# Patient Record
Sex: MALE | Race: WHITE | Employment: OTHER | URBAN - METROPOLITAN AREA
[De-identification: names, ages, dates, MRNs, and addresses within clinical notes are randomized per-mention and may not be internally consistent; named-entity substitution may affect disease eponyms.]

---

## 2018-10-18 NOTE — PROGRESS NOTES
Patient pre-assessment complete for AMBER with Dr Porfirio Reyes scheduled for 10/19/18 at 11am, arrival time 9am. Patient verified using . Patient instructed to bring all home medications in labeled bottles on the day of procedure. NPO status reinforced. Patient instructed to HOLD lasix & spironolactone in am. Instructed they can take all other medications excluding vitamins & supplements. Patient verbalizes understanding of all instructions & denies any questions at this time. Pt states that he stopped his xarelto (last dose Monday) in preparation for procedure, instructed to start his xarelto back as it is not necessary to stop for this procedure. Discussed importance of continuing this medication as ordered & to not stop without talking to Cardiologist first. Voiced understanding, states \"I thought they were doing the whole watchman procedure tomorrow, that's why I stopped it\" Informed only doing AMBER tomorrow & that we would schedule the Watchman procedure for another date. Informed that once his procedure is scheduled we would discuss his xarelto sothat he knew exactly when t o stop it.  Voiced understanding

## 2018-10-18 NOTE — PROGRESS NOTES
Called patient to inform of change in procedure time, procedure moved to 1:30pm with arrival time of 12pm. Voiced Understanding. Patient agreeable with time change.

## 2018-10-19 ENCOUNTER — HOSPITAL ENCOUNTER (OUTPATIENT)
Age: 78
Setting detail: OUTPATIENT SURGERY
Discharge: HOME | End: 2018-10-19
Attending: INTERNAL MEDICINE | Admitting: INTERNAL MEDICINE

## 2018-10-19 ENCOUNTER — HOSPITAL ENCOUNTER (OUTPATIENT)
Dept: CARDIAC CATH/INVASIVE PROCEDURES | Age: 78
Discharge: HOME | End: 2018-10-19

## 2018-10-19 RX ORDER — HYDROMORPHONE HYDROCHLORIDE 2 MG/ML
0.5 INJECTION, SOLUTION INTRAMUSCULAR; INTRAVENOUS; SUBCUTANEOUS
Status: CANCELLED | OUTPATIENT
Start: 2018-10-19

## 2018-10-19 RX ORDER — SODIUM CHLORIDE 0.9 % (FLUSH) 0.9 %
5-10 SYRINGE (ML) INJECTION AS NEEDED
Status: CANCELLED | OUTPATIENT
Start: 2018-10-19

## 2018-10-19 RX ORDER — SODIUM CHLORIDE 0.9 % (FLUSH) 0.9 %
5-10 SYRINGE (ML) INJECTION EVERY 8 HOURS
Status: CANCELLED | OUTPATIENT
Start: 2018-10-19

## 2018-10-19 RX ORDER — LIDOCAINE HYDROCHLORIDE 10 MG/ML
0.1 INJECTION INFILTRATION; PERINEURAL AS NEEDED
Status: CANCELLED | OUTPATIENT
Start: 2018-10-19

## 2018-10-19 RX ORDER — ONDANSETRON 2 MG/ML
4 INJECTION INTRAMUSCULAR; INTRAVENOUS
Status: CANCELLED | OUTPATIENT
Start: 2018-10-19 | End: 2018-10-20

## 2018-10-19 RX ORDER — ALBUTEROL SULFATE 0.83 MG/ML
2.5 SOLUTION RESPIRATORY (INHALATION) AS NEEDED
Status: CANCELLED | OUTPATIENT
Start: 2018-10-19

## 2018-10-19 RX ORDER — SODIUM CHLORIDE, SODIUM LACTATE, POTASSIUM CHLORIDE, CALCIUM CHLORIDE 600; 310; 30; 20 MG/100ML; MG/100ML; MG/100ML; MG/100ML
1000 INJECTION, SOLUTION INTRAVENOUS CONTINUOUS
Status: CANCELLED | OUTPATIENT
Start: 2018-10-19 | End: 2018-10-20

## 2018-10-19 NOTE — PROGRESS NOTES
Stated went down stairs to cafeteria while waiting to be called for procedure and ate several bites of chicken, corn and had a half a cup of tea. Dr. Itz Palacios notified. Procedure cancelled.

## 2018-10-19 NOTE — PROGRESS NOTES
Patient received to [address] [place] [address] room # 8  Ambulatory from [facility]. Patient scheduled for AMBER today with Dr Ashleigh Guerrero. Procedure reviewed & questions answered, voiced good understanding consent obtained & placed on chart. All medications and medical history reviewed. Will prep patient per orders. Patient & family updated on plan of care.       The patient has a fraility score of 3-MANAGING WELL, based on reports some SOB with exertion

## 2018-11-05 ENCOUNTER — HOSPITAL ENCOUNTER (OUTPATIENT)
Dept: CARDIAC CATH/INVASIVE PROCEDURES | Age: 78
Discharge: HOME | End: 2018-11-05
Attending: INTERNAL MEDICINE | Admitting: INTERNAL MEDICINE
Payer: MEDICARE

## 2018-11-05 VITALS
TEMPERATURE: 98.1 F | DIASTOLIC BLOOD PRESSURE: 56 MMHG | HEART RATE: 65 BPM | RESPIRATION RATE: 14 BRPM | OXYGEN SATURATION: 94 % | WEIGHT: 186 LBS | BODY MASS INDEX: 26.04 KG/M2 | HEIGHT: 71 IN | SYSTOLIC BLOOD PRESSURE: 117 MMHG

## 2018-11-05 LAB
ATRIAL RATE: 63 BPM
CALCULATED P AXIS, ECG09: -23 DEGREES
CALCULATED R AXIS, ECG10: 134 DEGREES
CALCULATED T AXIS, ECG11: 141 DEGREES
DIAGNOSIS, 93000: NORMAL
ERYTHROCYTE [DISTWIDTH] IN BLOOD BY AUTOMATED COUNT: 14.2 %
HCT VFR BLD AUTO: 46.6 % (ref 41.1–50.3)
HGB BLD-MCNC: 15.5 G/DL (ref 13.6–17.2)
INR PPP: 1.5
MCH RBC QN AUTO: 33 PG (ref 26.1–32.9)
MCHC RBC AUTO-ENTMCNC: 33.3 G/DL (ref 31.4–35)
MCV RBC AUTO: 99.4 FL (ref 79.6–97.8)
NRBC # BLD: 0 K/UL (ref 0–0.2)
PLATELET # BLD AUTO: 142 K/UL (ref 150–450)
PMV BLD AUTO: 11.5 FL (ref 9.4–12.3)
PROTHROMBIN TIME: 17.2 SEC (ref 11.5–14.5)
Q-T INTERVAL, ECG07: 274 MS
QRS DURATION, ECG06: 34 MS
QTC CALCULATION (BEZET), ECG08: 387 MS
RBC # BLD AUTO: 4.69 M/UL (ref 4.23–5.6)
VENTRICULAR RATE, ECG03: 120 BPM
WBC # BLD AUTO: 9.4 K/UL (ref 4.3–11.1)

## 2018-11-05 PROCEDURE — 74011250636 HC RX REV CODE- 250/636: Performed by: INTERNAL MEDICINE

## 2018-11-05 PROCEDURE — 74011000250 HC RX REV CODE- 250: Performed by: INTERNAL MEDICINE

## 2018-11-05 PROCEDURE — 74011250636 HC RX REV CODE- 250/636

## 2018-11-05 PROCEDURE — 93005 ELECTROCARDIOGRAM TRACING: CPT | Performed by: INTERNAL MEDICINE

## 2018-11-05 PROCEDURE — 85610 PROTHROMBIN TIME: CPT

## 2018-11-05 PROCEDURE — 85027 COMPLETE CBC AUTOMATED: CPT

## 2018-11-05 PROCEDURE — 93312 ECHO TRANSESOPHAGEAL: CPT

## 2018-11-05 PROCEDURE — 99152 MOD SED SAME PHYS/QHP 5/>YRS: CPT

## 2018-11-05 RX ORDER — MIDAZOLAM HYDROCHLORIDE 1 MG/ML
.5-2 INJECTION, SOLUTION INTRAMUSCULAR; INTRAVENOUS
Status: DISCONTINUED | OUTPATIENT
Start: 2018-11-05 | End: 2018-11-05

## 2018-11-05 RX ORDER — LIDOCAINE HYDROCHLORIDE 20 MG/ML
15 SOLUTION OROPHARYNGEAL AS NEEDED
Status: DISCONTINUED | OUTPATIENT
Start: 2018-11-05 | End: 2018-11-05

## 2018-11-05 RX ORDER — FENTANYL CITRATE 50 UG/ML
25-100 INJECTION, SOLUTION INTRAMUSCULAR; INTRAVENOUS
Status: DISCONTINUED | OUTPATIENT
Start: 2018-11-05 | End: 2018-11-05

## 2018-11-05 RX ORDER — SODIUM CHLORIDE 9 MG/ML
75 INJECTION, SOLUTION INTRAVENOUS CONTINUOUS
Status: DISCONTINUED | OUTPATIENT
Start: 2018-11-05 | End: 2018-11-05 | Stop reason: HOSPADM

## 2018-11-05 RX ADMIN — FENTANYL CITRATE 50 MCG: 50 INJECTION, SOLUTION INTRAMUSCULAR; INTRAVENOUS at 14:52

## 2018-11-05 RX ADMIN — MIDAZOLAM HYDROCHLORIDE 2 MG: 1 INJECTION, SOLUTION INTRAMUSCULAR; INTRAVENOUS at 14:59

## 2018-11-05 RX ADMIN — SODIUM CHLORIDE 75 ML/HR: 900 INJECTION, SOLUTION INTRAVENOUS at 14:30

## 2018-11-05 RX ADMIN — LIDOCAINE HYDROCHLORIDE 15 ML: 20 SOLUTION ORAL; TOPICAL at 14:30

## 2018-11-05 RX ADMIN — MIDAZOLAM HYDROCHLORIDE 2 MG: 1 INJECTION, SOLUTION INTRAMUSCULAR; INTRAVENOUS at 14:52

## 2018-11-05 NOTE — PROGRESS NOTES
Pt arrived, ambulated to room with no visible problems, planned AMBER for Dr Daly Anna. Consent signed, Procedure discussed with pt all questions answered voiced understanding. Medications and history discussed with pt.  
 
Pt prepped per ordersThe patient has a fraility score of 4-VULNERABLE, based on ability to complete ADLS without assistance, increased symptoms on Arrival.

## 2018-11-05 NOTE — PROGRESS NOTES
TRANSFER - OUT REPORT: 
 
Verbal report given to María Lazo RN (name) on Danile Bustillos  being transferred to [facility] (unit) for routine progression of care Report consisted of patients Situation, Background, Assessment and  
Recommendations(SBAR). Information from the following report(s) SBAR was reviewed with the receiving nurse. Lines:  
Peripheral IV 11/05/18 Anterior; Left Wrist (Active) Opportunity for questions and clarification was provided. Pt had AMBER. Tolerated well. Pt received Versed 4mg IV and Fentanyl 50mcg IV. Pt to remain NPO until 1630.

## 2018-11-05 NOTE — DISCHARGE INSTRUCTIONS
AFTER YOU TRANSESOPHAGEAL ECHOCARDIOGRAM    Be sure someone else drives you home. You may feel drowsy for several hours. Do not eat or drink for at least two hours after your procedure. Your throat will be numb and there is a risk you might have difficulty swallowing for a while. Be careful when you do eat or drink for the first time especially with hot fluids since you could easily burn your throat. Call your doctor if:    · You are bleeding from your throat or mouth. · You have trouble breathing all of a sudden. · You have chest pain or any pain that spreads to your neck, jaw, or arms. · You have questions or concerns. · You have a fever greater than 101°F.    Doctor: ***    Special Instructions:    No driving for 24 hours.   ***

## 2018-11-05 NOTE — PROGRESS NOTES
Report received from [facility] cath lab RN for continue of care post AMBER. . Will continue to monitor until discharge

## 2018-11-05 NOTE — H&P
[facility] History & Physical  
  
Date of  Admission: 11/5/2018 11:14 AM  
 
CC: Pre Watchman AMBER 
 
HPI:  Emily Cardenas is a 69 y.o. male Emily Cardenas is a very pleasant 69year old patient with a long-term ischemic dilated cardiomyopathy that had been followed by [facility] for many years' and now followed by Clarisa Nelson has a history of coronary artery disease with four-vessel CABG in October 2004. [facility] also has a history of chronic ischemic dilated cardiomyopathy with ejection fraction of 20-25% for many years' time.  He did have a history of a Medtronic dual-chamber ICD that was implanted in the past and eventually appeared to be upgraded to a [address] did have a recalled lead and underwent a laser lead extraction by Dr. Karis Ramos in October 2011.   
  
Pt had episode of some palpitations and probable afib over the past few weeks, however, currently feels well. He denies chest pain, SOB, presyncope, or syncope. Pt was taken off anticoagulation for recurrent hematuria.  
  
Cardiac PMH: (Old records have been reviewed and summarized below) 1.  Coronary artery disease status post four-vessel CABG in October 2004. 2.  History of myocardial infarction as early as 1995. 3.  Biventricular ICD since October 2004. 4.  Laser lead extraction of recalled Medtronic ICD lead in September 2011 by Dr. Karis Ramos. 5.  Atrial lead damage at the time of laser lead extraction.   
6.  Currently has a Medtronic biventricular ICD implanted in September 2011. 7.  Moderate to severe mitral regurgitation. 8.  Paroxysmal atrial fibrillation. 9.  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   
10. Complete thrombosis of left subclavian vein. 11. Chronic systolic heart failure, EF 25%. 12. Ventricular tachycardia. 13. ECHO - Jan 2014 - EF 15%, mod MR 
14. CV - June 2014 - Able to maintain NSR for about 1 week 15. CV - June 2014 - Able to maintain NSR with amio 16. ECHO - Aug 2014 - EF<15% 17. A. Fib ablation - Oct 2014 - done at [facility] 18. Gen Change - MDT - Aug 2015 19. CV - Tikosyn loading - May 2016 
20. Acute Amio reaction - May 2016 
21. ECHO - Aug 2016 - EF 15-20%, Mod MR Past Medical History:  
Diagnosis Date  Acute diastolic CHF (congestive heart failure) ([address] 9/15/2015  Arrhythmia   
 afib, icd  Atypical atrial flutter ([address] 4/14/2016  Biventricular ICD (implantable cardioverter-defibrillator) in place 2004  CAD (coronary artery disease) stents heart 2002, cabg 2002  Chest pain 9/15/2015  Chronic systolic CHF (congestive heart failure) ([address] 4/28/2016  Coronary atherosclerosis of native coronary vessel 9/15/2015  Dyspnea 9/15/2015  Essential hypertension, benign 9/15/2015  Heart failure ([address]  Hx of CABG 2004  Hyperlipidemia 9/15/2015  Hypertension  Hypothyroidism 6/3/2014  Ischemic cardiomyopathy 6/3/2014  Long-term use of high-risk medication 5/23/2016  Myocardial infarction acute ([address] 9/15/2015  Other and unspecified angina pectoris 9/15/2015  Palpitations 9/15/2015  Paroxysmal ventricular tachycardia ([address] 9/15/2015  Persistent atrial fibrillation ([address] 5/6/2016 1. Attempt A. Fib ablation at [facility] - 2015 2. 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  
Procedure Laterality Date  CARDIAC SURG PROCEDURE UNLIST    
 cabg nov 2004  HX HEENT    
 tonsillectomy  HX PACEMAKER    
 nov 2004 Allergies Allergen Reactions  Pradaxa [Dabigatran Etexilate] Not Reported This Time and Anaphylaxis  Shellfish Derived Nausea and Vomiting Social History Socioeconomic History  Marital status:  Spouse name: Not on file  Number of children: Not on file  Years of education: Not on file  Highest education level: Not on file Social Needs  Financial resource strain: Not on file  Food insecurity - worry: Not on file  Food insecurity - inability: Not on file  Transportation needs - medical: Not on file  Transportation needs - non-medical: Not on file Occupational History  Not on file Tobacco Use  Smoking status: Former Smoker Last attempt to quit: 1/1/2003 Years since quitting: 15.8  Smokeless tobacco: Never Used  Tobacco comment: pipe quit 10 years ago Substance and Sexual Activity  Alcohol use: No  
 Drug use: No  
 Sexual activity: Not on file Other Topics Concern  Not on file Social History Narrative  Not on file Family History Problem Relation Age of Onset  Heart Attack Other  Heart Failure Other Congestive Current Facility-Administered Medications Medication Dose Route Frequency  0.9% sodium chloride infusion  75 mL/hr IntraVENous CONTINUOUS  
 midazolam (VERSED) injection 0.5-2 mg  0.5-2 mg IntraVENous Multiple  fentaNYL citrate (PF) injection  mcg   mcg IntraVENous Multiple  lidocaine (XYLOCAINE) 2 % viscous solution 15 mL  15 mL Mouth/Throat PRN Review of Systems Review of Systems Constitution: Negative for chills, decreased appetite, fever, malaise/fatigue, weight gain and weight loss. Cardiovascular: Negative for chest pain, dyspnea on exertion, irregular heartbeat, near-syncope, orthopnea, palpitations, paroxysmal nocturnal dyspnea and syncope. Respiratory: Negative for cough, shortness of breath and wheezing. Musculoskeletal: Negative for joint pain and myalgias. Subjective:  
 
Visit Vitals BP 99/59 Pulse 65 Temp 98.1 °F (36.7 °C) Resp 17 Ht 5' 10.5\" (1.791 m) Wt 84.4 kg (186 lb) SpO2 96% BMI 26.31 kg/m² General appearance: Alert, well appearing, and in no distress Eyes: Sclerae anicteric ENT: Hearing grossly normal bilaterally CV: RR, paced, distant S1, S2, no M/R/G, no JVD, normal distal pulses, no lower extremity edema Pulm: Clear to auscultation, no wheezes, no crackles, no accessory muscle use GI: Soft, nontender, nondistended Neuro: Alert and oriented Skin: Normal coloration and turgor. 
  
 
 
 
Labs:  
Recent Results (from the past 24 hour(s)) EKG, 12 LEAD, INITIAL Collection Time: 11/05/18  1:27 PM  
Result Value Ref Range Ventricular Rate 120 BPM  
 Atrial Rate 63 BPM  
 QRS Duration 34 ms Q-T Interval 274 ms QTC Calculation (Bezet) 387 ms Calculated P Axis -23 degrees Calculated R Axis 134 degrees Calculated T Axis 141 degrees Diagnosis Ventricular-paced rhythm Abnormal ECG When compared with ECG of 09-MAY-2016 07:58, 
Vent. rate has increased BY   5 BPM 
  
CBC W/O DIFF Collection Time: 11/05/18  1:30 PM  
Result Value Ref Range WBC 9.4 4.3 - 11.1 K/uL  
 RBC 4.69 4.23 - 5.6 M/uL  
 HGB 15.5 13.6 - 17.2 g/dL HCT 46.6 41.1 - 50.3 % MCV 99.4 (H) 79.6 - 97.8 FL  
 MCH 33.0 (H) 26.1 - 32.9 PG  
 MCHC 33.3 31.4 - 35.0 g/dL  
 RDW 14.2 % PLATELET 802 (L) 688 - 450 K/uL MPV 11.5 9.4 - 12.3 FL ABSOLUTE NRBC 0.00 0.0 - 0.2 K/uL PROTHROMBIN TIME + INR Collection Time: 11/05/18  1:30 PM  
Result Value Ref Range Prothrombin time 17.2 (H) 11.5 - 14.5 sec INR 1.5 Assessment/Plan: 1.  ICD implantation.  Stable BiV ICD device, chronic high atrial threshold, cont device checks 
  
2.  Atrial fibrillation.  Able to maintain NSR Post A. Fib ablation done at [facility]. Post CV and Tikosyn loading. 
  
3.  Ventricular tachycardia.  His device has shown no recurrent events.  
  
4.  Long term use of high-risk medications. Monitor renal function with Xarleto/Tikosyn - PCP is following labs 
  
5.  C/S CHF - Stable EF 15-20%. On Coreg and lisinopril 
  
6.  CVA protection: Mr. Valerie Weiner is a pleasant 70 yo with a history of afib and recurrent hematuria and is an excellent candidate for an alternative to oral anticoagulation. Plan AMBER today for watchman evaluation Zbigniew Hernandez MD 
11/5/2018 2:13 PM

## 2018-11-26 NOTE — PROGRESS NOTES
Patient pre-assessment complete for Rodriguez Mendoza scheduled for 18, arrival time 1000. Patient verified using . Patient instructed to bring all home medications in labeled bottles on the day of procedure. NPO status reinforced. Patient's last dose of xarelto will be today (18). On the day of his procedure he will take coreg and tikosyn (synthroid is taken at night). He will hold all other medications. Patient verbalizes understanding of all instructions & denies any questions at this time. He has the [address] [place] [address] Drive coordinator contact information if he were to have any other questions.

## 2018-11-27 ENCOUNTER — HOSPITAL ENCOUNTER (OUTPATIENT)
Dept: LAB | Age: 78
Discharge: HOME | DRG: 274 | End: 2018-11-27
Payer: MEDICARE

## 2018-11-27 LAB
ANION GAP SERPL CALC-SCNC: 8 MMOL/L (ref 7–16)
BASOPHILS # BLD: 0.1 K/UL (ref 0–0.2)
BASOPHILS NFR BLD: 1 % (ref 0–2)
BUN SERPL-MCNC: 34 MG/DL (ref 8–23)
CALCIUM SERPL-MCNC: 10 MG/DL (ref 8.3–10.4)
CHLORIDE SERPL-SCNC: 104 MMOL/L (ref 98–107)
CO2 SERPL-SCNC: 27 MMOL/L (ref 21–32)
CREAT SERPL-MCNC: 1.46 MG/DL (ref 0.8–1.5)
DIFFERENTIAL METHOD BLD: ABNORMAL
EOSINOPHIL # BLD: 0.1 K/UL (ref 0–0.8)
EOSINOPHIL NFR BLD: 2 % (ref 0.5–7.8)
ERYTHROCYTE [DISTWIDTH] IN BLOOD BY AUTOMATED COUNT: 15.1 %
GLUCOSE SERPL-MCNC: 103 MG/DL (ref 65–100)
HCT VFR BLD AUTO: 47 % (ref 41.1–50.3)
HGB BLD-MCNC: 15.4 G/DL (ref 13.6–17.2)
IMM GRANULOCYTES # BLD: 0 K/UL (ref 0–0.5)
IMM GRANULOCYTES NFR BLD AUTO: 0 % (ref 0–5)
INR PPP: 1.7
LYMPHOCYTES # BLD: 2.6 K/UL (ref 0.5–4.6)
LYMPHOCYTES NFR BLD: 32 % (ref 13–44)
MCH RBC QN AUTO: 32.4 PG (ref 26.1–32.9)
MCHC RBC AUTO-ENTMCNC: 32.8 G/DL (ref 31.4–35)
MCV RBC AUTO: 98.9 FL (ref 79.6–97.8)
MONOCYTES # BLD: 0.5 K/UL (ref 0.1–1.3)
MONOCYTES NFR BLD: 7 % (ref 4–12)
NEUTS SEG # BLD: 4.8 K/UL (ref 1.7–8.2)
NEUTS SEG NFR BLD: 59 % (ref 43–78)
NRBC # BLD: 0 K/UL (ref 0–0.2)
PLATELET # BLD AUTO: 137 K/UL (ref 150–450)
PMV BLD AUTO: 11.8 FL (ref 9.4–12.3)
POTASSIUM SERPL-SCNC: 4.2 MMOL/L (ref 3.5–5.1)
PROTHROMBIN TIME: 19.2 SEC (ref 11.5–14.5)
RBC # BLD AUTO: 4.75 M/UL (ref 4.23–5.6)
SODIUM SERPL-SCNC: 139 MMOL/L (ref 136–145)
WBC # BLD AUTO: 8.2 K/UL (ref 4.3–11.1)

## 2018-11-27 PROCEDURE — 86901 BLOOD TYPING SEROLOGIC RH(D): CPT

## 2018-11-27 PROCEDURE — 86920 COMPATIBILITY TEST SPIN: CPT

## 2018-11-27 PROCEDURE — 36415 COLL VENOUS BLD VENIPUNCTURE: CPT

## 2018-11-27 PROCEDURE — 85025 COMPLETE CBC W/AUTO DIFF WBC: CPT

## 2018-11-27 PROCEDURE — 80048 BASIC METABOLIC PNL TOTAL CA: CPT

## 2018-11-27 PROCEDURE — 85610 PROTHROMBIN TIME: CPT

## 2018-11-28 ENCOUNTER — ANESTHESIA EVENT (OUTPATIENT)
Dept: SURGERY | Age: 78
DRG: 274 | End: 2018-11-28
Payer: MEDICARE

## 2018-11-28 ENCOUNTER — ANESTHESIA (OUTPATIENT)
Dept: SURGERY | Age: 78
DRG: 274 | End: 2018-11-28
Payer: MEDICARE

## 2018-11-28 ENCOUNTER — HOSPITAL ENCOUNTER (INPATIENT)
Age: 78
LOS: 1 days | Discharge: HOME | DRG: 274 | End: 2018-11-29
Attending: INTERNAL MEDICINE | Admitting: INTERNAL MEDICINE
Payer: MEDICARE

## 2018-11-28 PROBLEM — I48.91 A-FIB (HCC): Status: ACTIVE | Noted: 2018-11-28

## 2018-11-28 LAB
ACT BLD: 290 SECS (ref 70–128)
ATRIAL RATE: 67 BPM
CALCULATED R AXIS, ECG10: -90 DEGREES
CALCULATED T AXIS, ECG11: -22 DEGREES
DIAGNOSIS, 93000: NORMAL
Q-T INTERVAL, ECG07: 302 MS
QRS DURATION, ECG06: 174 MS
QTC CALCULATION (BEZET), ECG08: 408 MS
VENTRICULAR RATE, ECG03: 110 BPM

## 2018-11-28 PROCEDURE — 74011250636 HC RX REV CODE- 250/636

## 2018-11-28 PROCEDURE — 74011250637 HC RX REV CODE- 250/637: Performed by: INTERNAL MEDICINE

## 2018-11-28 PROCEDURE — C1760 CLOSURE DEV, VASC: HCPCS

## 2018-11-28 PROCEDURE — 65660000000 HC RM CCU STEPDOWN

## 2018-11-28 PROCEDURE — 93312 ECHO TRANSESOPHAGEAL: CPT

## 2018-11-28 PROCEDURE — 74011250636 HC RX REV CODE- 250/636: Performed by: INTERNAL MEDICINE

## 2018-11-28 PROCEDURE — 77030004559 HC CATH ANGI DX SUPT CARD -B

## 2018-11-28 PROCEDURE — 77030013292 HC BOWL MX PRSM J&J -A: Performed by: ANESTHESIOLOGY

## 2018-11-28 PROCEDURE — 93005 ELECTROCARDIOGRAM TRACING: CPT | Performed by: INTERNAL MEDICINE

## 2018-11-28 PROCEDURE — C1894 INTRO/SHEATH, NON-LASER: HCPCS

## 2018-11-28 PROCEDURE — 76937 US GUIDE VASCULAR ACCESS: CPT

## 2018-11-28 PROCEDURE — 93662 INTRACARDIAC ECG (ICE): CPT

## 2018-11-28 PROCEDURE — C1769 GUIDE WIRE: HCPCS

## 2018-11-28 PROCEDURE — 77030037088 HC TUBE ENDOTRACH ORAL NSL COVD-A: Performed by: ANESTHESIOLOGY

## 2018-11-28 PROCEDURE — 74011636320 HC RX REV CODE- 636/320: Performed by: INTERNAL MEDICINE

## 2018-11-28 PROCEDURE — 77030013794 HC KT TRNSDUC BLD EDWD -B: Performed by: ANESTHESIOLOGY

## 2018-11-28 PROCEDURE — 77030019908 HC STETH ESOPH SIMS -A: Performed by: ANESTHESIOLOGY

## 2018-11-28 PROCEDURE — C1893 INTRO/SHEATH, FIXED,NON-PEEL: HCPCS

## 2018-11-28 PROCEDURE — 77030013687 HC GD NDL BARD -B

## 2018-11-28 PROCEDURE — 76210000006 HC OR PH I REC 0.5 TO 1 HR: Performed by: INTERNAL MEDICINE

## 2018-11-28 PROCEDURE — 02L73DK OCCLUSION OF LEFT ATRIAL APPENDAGE WITH INTRALUMINAL DEVICE, PERCUTANEOUS APPROACH: ICD-10-PCS | Performed by: INTERNAL MEDICINE

## 2018-11-28 PROCEDURE — 74011250636 HC RX REV CODE- 250/636: Performed by: ANESTHESIOLOGY

## 2018-11-28 PROCEDURE — 33340 PERQ CLSR TCAT L ATR APNDGE: CPT

## 2018-11-28 PROCEDURE — 74011250637 HC RX REV CODE- 250/637: Performed by: ANESTHESIOLOGY

## 2018-11-28 PROCEDURE — 94660 CPAP INITIATION&MGMT: CPT

## 2018-11-28 PROCEDURE — 77030020506 HC NDL TRNSPTL NRG BAYL -F

## 2018-11-28 PROCEDURE — 77030005401 HC CATH RAD ARRO -A: Performed by: ANESTHESIOLOGY

## 2018-11-28 PROCEDURE — 77030038111 HC IMPL LAA WATCHMAN 27MM BSC -L

## 2018-11-28 PROCEDURE — B24BZZ4 ULTRASONOGRAPHY OF HEART WITH AORTA, TRANSESOPHAGEAL: ICD-10-PCS | Performed by: INTERNAL MEDICINE

## 2018-11-28 PROCEDURE — 77030020407 HC IV BLD WRMR ST 3M -A: Performed by: ANESTHESIOLOGY

## 2018-11-28 PROCEDURE — 77030039425 HC BLD LARYNG TRULITE DISP TELE -A: Performed by: ANESTHESIOLOGY

## 2018-11-28 PROCEDURE — C1759 CATH, INTRA ECHOCARDIOGRAPHY: HCPCS

## 2018-11-28 PROCEDURE — 77030020782 HC GWN BAIR PAWS FLX 3M -B: Performed by: ANESTHESIOLOGY

## 2018-11-28 PROCEDURE — 85347 COAGULATION TIME ACTIVATED: CPT

## 2018-11-28 PROCEDURE — 74011000250 HC RX REV CODE- 250

## 2018-11-28 PROCEDURE — 76060000036 HC ANESTHESIA 2.5 TO 3 HR: Performed by: INTERNAL MEDICINE

## 2018-11-28 RX ORDER — CARVEDILOL 12.5 MG/1
12.5 TABLET ORAL 2 TIMES DAILY WITH MEALS
Status: DISCONTINUED | OUTPATIENT
Start: 2018-11-28 | End: 2018-11-29 | Stop reason: HOSPADM

## 2018-11-28 RX ORDER — ACETAMINOPHEN 325 MG/1
650 TABLET ORAL
Status: DISCONTINUED | OUTPATIENT
Start: 2018-11-28 | End: 2018-11-29 | Stop reason: HOSPADM

## 2018-11-28 RX ORDER — HEPARIN SODIUM 1000 [USP'U]/ML
INJECTION, SOLUTION INTRAVENOUS; SUBCUTANEOUS AS NEEDED
Status: DISCONTINUED | OUTPATIENT
Start: 2018-11-28 | End: 2018-11-28 | Stop reason: HOSPADM

## 2018-11-28 RX ORDER — DOFETILIDE 0.25 MG/1
250 CAPSULE ORAL 2 TIMES DAILY
Status: DISCONTINUED | OUTPATIENT
Start: 2018-11-28 | End: 2018-11-28

## 2018-11-28 RX ORDER — NALOXONE HYDROCHLORIDE 0.4 MG/ML
0.1 INJECTION, SOLUTION INTRAMUSCULAR; INTRAVENOUS; SUBCUTANEOUS
Status: DISCONTINUED | OUTPATIENT
Start: 2018-11-28 | End: 2018-11-28 | Stop reason: HOSPADM

## 2018-11-28 RX ORDER — SPIRONOLACTONE 25 MG/1
25 TABLET ORAL DAILY
Status: DISCONTINUED | OUTPATIENT
Start: 2018-11-29 | End: 2018-11-29 | Stop reason: HOSPADM

## 2018-11-28 RX ORDER — SODIUM CHLORIDE 9 MG/ML
1 INJECTION, SOLUTION INTRAVENOUS AS NEEDED
Status: DISCONTINUED | OUTPATIENT
Start: 2018-11-28 | End: 2018-11-28

## 2018-11-28 RX ORDER — GLYCOPYRROLATE 0.2 MG/ML
INJECTION INTRAMUSCULAR; INTRAVENOUS AS NEEDED
Status: DISCONTINUED | OUTPATIENT
Start: 2018-11-28 | End: 2018-11-28 | Stop reason: HOSPADM

## 2018-11-28 RX ORDER — SODIUM CHLORIDE, SODIUM LACTATE, POTASSIUM CHLORIDE, CALCIUM CHLORIDE 600; 310; 30; 20 MG/100ML; MG/100ML; MG/100ML; MG/100ML
75 INJECTION, SOLUTION INTRAVENOUS CONTINUOUS
Status: DISCONTINUED | OUTPATIENT
Start: 2018-11-28 | End: 2018-11-28 | Stop reason: HOSPADM

## 2018-11-28 RX ORDER — FUROSEMIDE 40 MG/1
40 TABLET ORAL EVERY OTHER DAY
Status: DISCONTINUED | OUTPATIENT
Start: 2018-11-28 | End: 2018-11-29 | Stop reason: HOSPADM

## 2018-11-28 RX ORDER — ROCURONIUM BROMIDE 10 MG/ML
INJECTION, SOLUTION INTRAVENOUS AS NEEDED
Status: DISCONTINUED | OUTPATIENT
Start: 2018-11-28 | End: 2018-11-28 | Stop reason: HOSPADM

## 2018-11-28 RX ORDER — SODIUM CHLORIDE 9 MG/ML
INJECTION, SOLUTION INTRAVENOUS
Status: DISCONTINUED | OUTPATIENT
Start: 2018-11-28 | End: 2018-11-28 | Stop reason: HOSPADM

## 2018-11-28 RX ORDER — VECURONIUM BROMIDE FOR INJECTION 1 MG/ML
INJECTION, POWDER, LYOPHILIZED, FOR SOLUTION INTRAVENOUS AS NEEDED
Status: DISCONTINUED | OUTPATIENT
Start: 2018-11-28 | End: 2018-11-28 | Stop reason: HOSPADM

## 2018-11-28 RX ORDER — DUTASTERIDE 0.5 MG/1
0.5 CAPSULE, LIQUID FILLED ORAL DAILY
Status: DISCONTINUED | OUTPATIENT
Start: 2018-11-29 | End: 2018-11-28

## 2018-11-28 RX ORDER — LOSARTAN POTASSIUM 25 MG/1
25 TABLET ORAL DAILY
Status: DISCONTINUED | OUTPATIENT
Start: 2018-11-29 | End: 2018-11-29 | Stop reason: HOSPADM

## 2018-11-28 RX ORDER — OXYCODONE HYDROCHLORIDE 5 MG/1
5 TABLET ORAL
Status: COMPLETED | OUTPATIENT
Start: 2018-11-28 | End: 2018-11-28

## 2018-11-28 RX ORDER — NEOSTIGMINE METHYLSULFATE 1 MG/ML
INJECTION, SOLUTION INTRAVENOUS AS NEEDED
Status: DISCONTINUED | OUTPATIENT
Start: 2018-11-28 | End: 2018-11-28 | Stop reason: HOSPADM

## 2018-11-28 RX ORDER — HYDROCODONE BITARTRATE AND ACETAMINOPHEN 5; 325 MG/1; MG/1
1 TABLET ORAL
Status: DISCONTINUED | OUTPATIENT
Start: 2018-11-28 | End: 2018-11-29 | Stop reason: HOSPADM

## 2018-11-28 RX ORDER — HYDROMORPHONE HYDROCHLORIDE 2 MG/ML
0.5 INJECTION, SOLUTION INTRAMUSCULAR; INTRAVENOUS; SUBCUTANEOUS
Status: DISCONTINUED | OUTPATIENT
Start: 2018-11-28 | End: 2018-11-28 | Stop reason: HOSPADM

## 2018-11-28 RX ORDER — FENTANYL CITRATE 50 UG/ML
INJECTION, SOLUTION INTRAMUSCULAR; INTRAVENOUS AS NEEDED
Status: DISCONTINUED | OUTPATIENT
Start: 2018-11-28 | End: 2018-11-28 | Stop reason: HOSPADM

## 2018-11-28 RX ORDER — DIPHENHYDRAMINE HYDROCHLORIDE 50 MG/ML
12.5 INJECTION, SOLUTION INTRAMUSCULAR; INTRAVENOUS
Status: DISCONTINUED | OUTPATIENT
Start: 2018-11-28 | End: 2018-11-28 | Stop reason: RX

## 2018-11-28 RX ORDER — CEFAZOLIN SODIUM/WATER 2 G/20 ML
2 SYRINGE (ML) INTRAVENOUS
Status: COMPLETED | OUTPATIENT
Start: 2018-11-28 | End: 2018-11-28

## 2018-11-28 RX ORDER — LIDOCAINE HYDROCHLORIDE 20 MG/ML
INJECTION, SOLUTION EPIDURAL; INFILTRATION; INTRACAUDAL; PERINEURAL AS NEEDED
Status: DISCONTINUED | OUTPATIENT
Start: 2018-11-28 | End: 2018-11-28 | Stop reason: HOSPADM

## 2018-11-28 RX ORDER — LIDOCAINE HYDROCHLORIDE 10 MG/ML
0.1 INJECTION INFILTRATION; PERINEURAL AS NEEDED
Status: DISCONTINUED | OUTPATIENT
Start: 2018-11-28 | End: 2018-11-28 | Stop reason: HOSPADM

## 2018-11-28 RX ORDER — FLUMAZENIL 0.1 MG/ML
0.2 INJECTION INTRAVENOUS AS NEEDED
Status: DISCONTINUED | OUTPATIENT
Start: 2018-11-28 | End: 2018-11-28 | Stop reason: HOSPADM

## 2018-11-28 RX ORDER — SODIUM CHLORIDE 9 MG/ML
1 INJECTION, SOLUTION INTRAVENOUS AS NEEDED
Status: CANCELLED | OUTPATIENT
Start: 2018-11-28 | End: 2018-11-29

## 2018-11-28 RX ORDER — SODIUM CHLORIDE 0.9 % (FLUSH) 0.9 %
5-10 SYRINGE (ML) INJECTION EVERY 8 HOURS
Status: DISCONTINUED | OUTPATIENT
Start: 2018-11-28 | End: 2018-11-29 | Stop reason: HOSPADM

## 2018-11-28 RX ORDER — HEPARIN SODIUM 200 [USP'U]/100ML
3 INJECTION, SOLUTION INTRAVENOUS CONTINUOUS
Status: DISCONTINUED | OUTPATIENT
Start: 2018-11-28 | End: 2018-11-28 | Stop reason: HOSPADM

## 2018-11-28 RX ORDER — OXYCODONE HYDROCHLORIDE 5 MG/1
10 TABLET ORAL
Status: DISCONTINUED | OUTPATIENT
Start: 2018-11-28 | End: 2018-11-28 | Stop reason: HOSPADM

## 2018-11-28 RX ORDER — PROPOFOL 10 MG/ML
INJECTION, EMULSION INTRAVENOUS AS NEEDED
Status: DISCONTINUED | OUTPATIENT
Start: 2018-11-28 | End: 2018-11-28 | Stop reason: HOSPADM

## 2018-11-28 RX ORDER — EPHEDRINE SULFATE 50 MG/ML
INJECTION, SOLUTION INTRAVENOUS AS NEEDED
Status: DISCONTINUED | OUTPATIENT
Start: 2018-11-28 | End: 2018-11-28 | Stop reason: HOSPADM

## 2018-11-28 RX ORDER — GUAIFENESIN 100 MG/5ML
81 LIQUID (ML) ORAL DAILY
Status: DISCONTINUED | OUTPATIENT
Start: 2018-11-29 | End: 2018-11-29 | Stop reason: HOSPADM

## 2018-11-28 RX ORDER — LEVOTHYROXINE SODIUM 125 UG/1
125 TABLET ORAL
Status: DISCONTINUED | OUTPATIENT
Start: 2018-11-29 | End: 2018-11-29 | Stop reason: HOSPADM

## 2018-11-28 RX ORDER — DOFETILIDE 0.25 MG/1
250 CAPSULE ORAL 2 TIMES DAILY
Status: DISCONTINUED | OUTPATIENT
Start: 2018-11-28 | End: 2018-11-29 | Stop reason: HOSPADM

## 2018-11-28 RX ORDER — FINASTERIDE 5 MG/1
5 TABLET, FILM COATED ORAL DAILY
Status: DISCONTINUED | OUTPATIENT
Start: 2018-11-29 | End: 2018-11-29 | Stop reason: HOSPADM

## 2018-11-28 RX ORDER — SODIUM CHLORIDE 0.9 % (FLUSH) 0.9 %
5-10 SYRINGE (ML) INJECTION AS NEEDED
Status: DISCONTINUED | OUTPATIENT
Start: 2018-11-28 | End: 2018-11-29 | Stop reason: HOSPADM

## 2018-11-28 RX ORDER — ALPRAZOLAM 0.5 MG/1
0.5 TABLET ORAL
Status: DISCONTINUED | OUTPATIENT
Start: 2018-11-28 | End: 2018-11-29 | Stop reason: HOSPADM

## 2018-11-28 RX ADMIN — SODIUM CHLORIDE: 9 INJECTION, SOLUTION INTRAVENOUS at 10:42

## 2018-11-28 RX ADMIN — NEOSTIGMINE METHYLSULFATE 4 MG: 1 INJECTION, SOLUTION INTRAVENOUS at 12:41

## 2018-11-28 RX ADMIN — EPHEDRINE SULFATE 10 MG: 50 INJECTION, SOLUTION INTRAVENOUS at 11:20

## 2018-11-28 RX ADMIN — FENTANYL CITRATE 100 MCG: 50 INJECTION, SOLUTION INTRAMUSCULAR; INTRAVENOUS at 10:42

## 2018-11-28 RX ADMIN — PROPOFOL 150 MG: 10 INJECTION, EMULSION INTRAVENOUS at 10:48

## 2018-11-28 RX ADMIN — HEPARIN SODIUM 3 UNITS/HR: 200 INJECTION, SOLUTION INTRAVENOUS at 10:51

## 2018-11-28 RX ADMIN — LIDOCAINE HYDROCHLORIDE 80 MG: 20 INJECTION, SOLUTION EPIDURAL; INFILTRATION; INTRACAUDAL; PERINEURAL at 10:48

## 2018-11-28 RX ADMIN — PROPOFOL 30 MG: 10 INJECTION, EMULSION INTRAVENOUS at 12:28

## 2018-11-28 RX ADMIN — Medication 10 ML: at 20:29

## 2018-11-28 RX ADMIN — OXYCODONE HYDROCHLORIDE 5 MG: 5 TABLET ORAL at 16:02

## 2018-11-28 RX ADMIN — SODIUM CHLORIDE, SODIUM LACTATE, POTASSIUM CHLORIDE, AND CALCIUM CHLORIDE: 600; 310; 30; 20 INJECTION, SOLUTION INTRAVENOUS at 10:42

## 2018-11-28 RX ADMIN — ROCURONIUM BROMIDE 50 MG: 10 INJECTION, SOLUTION INTRAVENOUS at 10:48

## 2018-11-28 RX ADMIN — DOFETILIDE 250 MCG: 0.25 CAPSULE ORAL at 20:28

## 2018-11-28 RX ADMIN — GLYCOPYRROLATE 0.6 MG: 0.2 INJECTION INTRAMUSCULAR; INTRAVENOUS at 12:41

## 2018-11-28 RX ADMIN — HEPARIN SODIUM 3 UNITS/HR: 200 INJECTION, SOLUTION INTRAVENOUS at 10:55

## 2018-11-28 RX ADMIN — Medication 2 G: at 11:00

## 2018-11-28 RX ADMIN — EPHEDRINE SULFATE 10 MG: 50 INJECTION, SOLUTION INTRAVENOUS at 11:32

## 2018-11-28 RX ADMIN — HEPARIN SODIUM 3 UNITS/HR: 200 INJECTION, SOLUTION INTRAVENOUS at 12:00

## 2018-11-28 RX ADMIN — RIVAROXABAN 20 MG: 20 TABLET, FILM COATED ORAL at 18:18

## 2018-11-28 RX ADMIN — IOPAMIDOL 45 ML: 755 INJECTION, SOLUTION INTRAVENOUS at 12:41

## 2018-11-28 RX ADMIN — SODIUM CHLORIDE 1 ML/KG/HR: 900 INJECTION, SOLUTION INTRAVENOUS at 10:32

## 2018-11-28 RX ADMIN — HEPARIN SODIUM 5000 UNITS: 1000 INJECTION, SOLUTION INTRAVENOUS; SUBCUTANEOUS at 11:32

## 2018-11-28 RX ADMIN — VECURONIUM BROMIDE FOR INJECTION 2 MG: 1 INJECTION, POWDER, LYOPHILIZED, FOR SOLUTION INTRAVENOUS at 11:54

## 2018-11-28 RX ADMIN — CARVEDILOL 12.5 MG: 12.5 TABLET, FILM COATED ORAL at 18:19

## 2018-11-28 RX ADMIN — Medication 10 ML: at 17:00

## 2018-11-28 NOTE — PROCEDURES
Pre-Electrophysiology Diagnosis  1. Atrial fibrillation  2. Intolerant to long term anticoagulation     Procedure Performed  1. Transesophageal echocardiogram  2. Transeptal puncture   3. Watchman implantation    Cardiac Electrophysiologist: Tung Florez MD  Interventional Cardiologist: Azra Rapp MD    Anesthesia: General     Estimated Blood Loss: Less than 10 mL     Specimens: * No specimens in log *    Procedure in Detail:  The patient was brought to the hybrid OR suite in the fasting state. The patient was intubated by anesthesiology and invasive arterial blood pressure monitoring obtained. A tranesophageal echocardiogram was performed directly prior to the procedure and was negative for a MIRANDA thrombus (see full report in chart). Dr. Otilia Cameron obtained venous access, placed an SLO and 11Fr short sheath into the right and left femoral veing and performed the transseptal as outlined in his procedure note. I was present and assisted with this portion of the procedure. ICE was used via the 11Fr sheath for improved visibility given the multiple pacemaker leads and right and left atrial enlargement. As the primary implanting phyisician, I prepped and draped the Watchman delivery sheath and exchanged for the SLO via an Amplatz super stiff wire located in the left upper pulmonary vein. A pig tail catheter was then inserted into the delivery sheath and used to guide the delivery sheath into the appendage. Depth measurements were performed with fluoroscopy and depth and size measurements determined via AMBER. The sheath was then advanced over the pig tail catheter into position within the MIRANDA. The Watchman device was then prepped per protocol and placed into the delivery sheath via a wet to wet connection and advanced into the sheath. The sheath was then pulled back to allow delivery of the Watchman device within the left atrial appendage.   Successful delivery of a 27 mm device revealed excellent PASS criteria. A contrast appendogram was performed revealing an adequate seal. After extensive evaluation including and excellent tug test, the device was deployed. Further measurements were taken post implant. The sheath was removed. At the completion of the Watchman implantation procedure, all catheters were removed, and a Perclose device was deployed by Dr. Olimpia Tatum for hemostasis at both access sites. The patient tolerated the procedure well with no acute complications recognized. Just prior to pulling shealths, the AMBER was used to obtain ultrasound images and revealed no evidence of pericardial effusion. Complications: None    Summary:   1. Successful Watchman implantation  2. Family updated. Constantin Obregon MD, MS  Clinical Cardiac Electrophysiology

## 2018-11-28 NOTE — PERIOP NOTES
TRANSFER - OUT REPORT: 
 
Verbal report given to Kingsley Pearce RN(name) on Angel Baker  being transferred to [facility](unit) for routine post - op Report consisted of patients Situation, Background, Assessment and  
Recommendations(SBAR). Information from the following report(s) SBAR, OR Summary, Procedure Summary, Intake/Output, MAR and Cardiac Rhythm PACED was reviewed with the receiving nurse. Lines:  
Peripheral IV 11/28/18 Anterior;Distal;Inferior; Lower;Right Arm (Active) Site Assessment Clean, dry, & intact 11/28/2018  2:02 PM  
Phlebitis Assessment 0 11/28/2018  2:02 PM  
Infiltration Assessment 0 11/28/2018  2:02 PM  
Dressing Status Clean, dry, & intact 11/28/2018  2:02 PM  
Dressing Type Transparent;Tape 11/28/2018  2:02 PM  
Hub Color/Line Status Infusing;Green 11/28/2018  2:02 PM  
Alcohol Cap Used No 11/28/2018  2:02 PM  
   
Peripheral IV 11/28/18 Anterior; Inferior;Left;Lower Arm (Active) Site Assessment Clean, dry, & intact 11/28/2018  2:02 PM  
Phlebitis Assessment 0 11/28/2018  2:02 PM  
Infiltration Assessment 0 11/28/2018  2:02 PM  
Dressing Status Clean, dry, & intact 11/28/2018  2:02 PM  
Dressing Type Transparent;Tape 11/28/2018  2:02 PM  
Hub Color/Line Status Capped;Flushed 11/28/2018  2:02 PM  
Alcohol Cap Used No 11/28/2018  2:02 PM  
  
 
Opportunity for questions and clarification was provided. Patient transported with: 
 Monitor O2 @ 3 liters Registered Nurse Tech 
 
VTE prophylaxis orders have been written for Angel Baker. Patient and family given floor number and nurses name. Family updated re: pt status after security code verified.

## 2018-11-28 NOTE — PROGRESS NOTES
Patient received to [address] [place] [address] room # 9  Ambulatory from [facility]. Patient scheduled for LAAO today with Dr Beatrice Velazquez. Procedure reviewed & questions answered, voiced good understanding consent obtained & placed on chart. All medications and medical history reviewed. Will prep patient per orders. Patient & family updated on plan of care. The patient has a fraility score of 4-VULNERABLE, based on patient A&Ox3, patient able to ambulate to room with some difficulty, patient does complain of shortness of breath after exertion.

## 2018-11-28 NOTE — ANESTHESIA PREPROCEDURE EVALUATION
Anesthetic History No history of anesthetic complications Review of Systems / Medical History Patient summary reviewed and pertinent labs reviewed Pulmonary Within defined limits Neuro/Psych Within defined limits Cardiovascular Hypertension: well controlled CHF Dysrhythmias : atrial fibrillation Pacemaker, CAD, CABG (2004) and hyperlipidemia Exercise tolerance: >4 METS Comments: AICD/Pacemaker Echo - EF 30%, no sig valve dz  
GI/Hepatic/Renal 
  
 
 
Renal disease (Cr 1.5): CRI Endo/Other Hypothyroidism Arthritis Other Findings Physical Exam 
 
Airway Mallampati: II 
TM Distance: > 6 cm Neck ROM: normal range of motion Mouth opening: Normal 
 
 Cardiovascular Rhythm: irregular Rate: normal 
 
 
 
 Dental 
No notable dental hx Pulmonary Breath sounds clear to auscultation Abdominal 
 
 
 
 Other Findings Anesthetic Plan ASA: 3 Anesthesia type: general 
 
Monitoring Plan: Arterial line Anesthetic plan and risks discussed with: Patient

## 2018-11-28 NOTE — PROGRESS NOTES
Skin assessment completed. Patient has bilat groin sites. Clean dry and intact with old oozing around them both. Scattered bruising on BUE and BLE. Sacrum dry and intact. Heels dry and intact.

## 2018-11-28 NOTE — ANESTHESIA POSTPROCEDURE EVALUATION
Procedure(s): LEFT ATRIAL APPENDAGE CLOSURE. Anesthesia Post Evaluation Multimodal analgesia: multimodal analgesia used between 6 hours prior to anesthesia start to PACU discharge Patient location during evaluation: PACU Patient participation: complete - patient participated Level of consciousness: awake Pain management: adequate Airway patency: patent Anesthetic complications: no 
Cardiovascular status: acceptable and hemodynamically stable Respiratory status: acceptable Hydration status: acceptable Comments: Acceptable for discharge from PACU. Visit Vitals /78 (BP 1 Location: Right arm, BP Patient Position: At rest) Pulse 64 Temp 36.8 °C (98.2 °F) Resp 16 Ht 5' 10.5\" (1.791 m) Wt 83.9 kg (185 lb) SpO2 93% BMI 26.17 kg/m²

## 2018-11-28 NOTE — PROGRESS NOTES
made initial visit. Pt was sitting up on the side of the bed eating dinner. Pt had family members visiting.  welcomed them to [facility] and shared information about  services.  provided spiritual care through presence, pastoral conversation, and assurance of prayer.

## 2018-11-28 NOTE — ANESTHESIA PROCEDURE NOTES
Arterial Line Placement Start time: 11/28/2018 10:44 AM 
End time: 11/28/2018 10:46 AM 
Performed by: Chandrika Swan CRNA Authorized by: Roderick Scott MD  
 
Pre-Procedure Indications:  Arterial pressure monitoring and blood sampling Preanesthetic Checklist: patient identified, risks and benefits discussed, anesthesia consent, site marked, patient being monitored, timeout performed and patient being monitored Timeout Time: 10:44 Procedure:  
Prep:  ChloraPrep Seldinger Technique?: Yes Orientation:  Left Location:  Radial artery Catheter size:  20 G Number of attempts:  1 Cont Cardiac Output Sensor: No   
 
Assessment:  
Post-procedure:  Line secured and sterile dressing applied Patient Tolerance:  Patient tolerated the procedure well with no immediate complications Comment:  
Left arm prepped with ChloraPrep, 0.8ml of 1% lidocaine infiltrated at skin, Seldinger technique, good blood return, good waveform.

## 2018-11-28 NOTE — H&P
[address] Cardiology/Electrophyiology Consult Date of  Admission: 11/28/2018  9:59 AM  
 
CC/Reason for admission: Jann Baxter is a 69 y.o. male admitted for Paroxysmal atrial fibrillation ([facility] [address] [I48.0]. 69year old patient with a long-term ischemic dilated cardiomyopathy that had been followed by [facility] for many years' and now followed by Lexie Waters has a history of coronary artery disease with four-vessel CABG in October 2004. [facility] also has a history of chronic ischemic dilated cardiomyopathy with ejection fraction of 20-25% for many years' time.  He did have a history of a Medtronic dual-chamber ICD that was implanted in the past and eventually appeared to be upgraded to a [address] did have a recalled lead and underwent a laser lead extraction by Dr. Tiana Ford in October 2011.   
  
Pt had episode of worsening palpitations and afib over the past few weeks, however, currently feels well. He denies chest pain, SOB, presyncope, or syncope. Pt was taken off anticoagulation for recurrent hematuria. Cardiac PMH: (Old records have been reviewed and summarized below) 1.  Coronary artery disease status post four-vessel CABG in October 2004. 2.  History of myocardial infarction as early as 1995. 3.  Biventricular ICD since October 2004. 4.  Laser lead extraction of recalled Medtronic ICD lead in September 2011 by Dr. Tiana Ford. 5.  Atrial lead damage at the time of laser lead extraction.   
6.  Currently has a Medtronic biventricular ICD implanted in September 2011. 7.  Moderate to severe mitral regurgitation. 8.  Paroxysmal atrial fibrillation. 9.  Experimental stem cell transplant in [facility] in 2007.   
10. Complete thrombosis of left subclavian vein. 11. Chronic systolic heart failure, EF 25%. 12. Ventricular tachycardia. 13. ECHO - Jan 2014 - EF 15%, mod MR 
14. CV - June 2014 - Able to maintain NSR for about 1 week 15. CV - June 2014 - Able to maintain NSR with amio 16. ECHO - Aug 2014 - EF<15% 17. A. Fib ablation - Oct 2014 - done at [facility] 18. Gen Change - MDT - Aug 2015 19. CV - Tikosyn loading - May 2016 
20. Acute Amio reaction - May 2016 
21. ECHO - Aug 2016 - EF 15-20%, Mod MR Patient Active Problem List  
Diagnosis Code  Ischemic cardiomyopathy I25.5  CAD (coronary artery disease) I25.10  Biventricular ICD (implantable cardioverter-defibrillator) in place Z95.810  
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  
 PVC (premature ventricular contraction) I49.3  Acute diastolic CHF (congestive heart failure) (HCC) I50.31  
 Other and unspecified angina pectoris I20.9  Myocardial infarction acute (HCC) I21.9  Chest pain R07.9  Atypical atrial flutter (HCC) I48.4  Chronic systolic CHF (congestive heart failure) (HCC) I50.22  
 Persistent atrial fibrillation (HCC) I48.1  Atrial fibrillation (HCC) I48.91  
 Long-term use of high-risk medication Z79.899  Congestive heart failure (HCC) I50.9  Chronic kidney disease N18.9  Coronary arteriosclerosis in native artery I25.10  History of high risk medication treatment Z92.29  
 Hypertension I10  
 History of coronary artery bypass surgery Z95.1  Cardiac defibrillator in place Z95.810  Myocardial infarction (HCC) I21.9  Chronic ischemic heart disease I25.9  History of cardiovascular disorder Z86.79  
 Subclavian vein thrombosis (HCC) I82. B19  
 Ventricular tachycardia (HCC) I47.2  Arrhythmia I49.9  Heart failure (HCC) I50.9  Hx of CABG Z95.1 Past Medical History:  
Diagnosis Date  Acute diastolic CHF (congestive heart failure) ([address] 9/15/2015  Arrhythmia   
 afib, icd  Atypical atrial flutter ([address] 4/14/2016  Biventricular ICD (implantable cardioverter-defibrillator) in place 2004  CAD (coronary artery disease) stents heart 2002, cabg 2002  Chest pain 9/15/2015  Chronic systolic CHF (congestive heart failure) ([address] 4/28/2016  Coronary atherosclerosis of native coronary vessel 9/15/2015  Dyspnea 9/15/2015  Essential hypertension, benign 9/15/2015  Heart failure ([address]  Hx of CABG 2004  Hyperlipidemia 9/15/2015  Hypertension  Hypothyroidism 6/3/2014  Ischemic cardiomyopathy 6/3/2014  Long-term use of high-risk medication 5/23/2016  Myocardial infarction acute ([address] 9/15/2015  Other and unspecified angina pectoris 9/15/2015  Palpitations 9/15/2015  Paroxysmal ventricular tachycardia ([address] 9/15/2015  Persistent atrial fibrillation ([address] 5/6/2016 1. Attempt A. Fib ablation at [address] - 2015 2. 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  
Procedure Laterality Date  CARDIAC SURG PROCEDURE UNLIST    
 cabg nov 2004  HX HEENT    
 tonsillectomy  HX PACEMAKER    
 nov 2004 Allergies Allergen Reactions  Pradaxa [Dabigatran Etexilate] Not Reported This Time and Anaphylaxis  Shellfish Derived Nausea and Vomiting Family History Problem Relation Age of Onset  Heart Attack Other  Heart Failure Other Congestive Current Facility-Administered Medications Medication Dose Route Frequency  lidocaine (XYLOCAINE) 10 mg/mL (1 %) injection 0.1 mL  0.1 mL SubCUTAneous PRN  
 lactated Ringers infusion  75 mL/hr IntraVENous CONTINUOUS  
  ceFAZolin (ANCEF) 2 g/20 mL in sterile water IV syringe  2 g IntraVENous ON CALL TO OR  
 0.9% sodium chloride infusion  1 mL/kg/hr IntraVENous PRN Review of Symptoms: A comprehensive ROS was performed with the pertinent positives and negatives mentioned in the HPI, all other systems reviewed and are negative Physical Exam 
Vitals:  
 11/28/18 1020 Weight: 83.9 kg (185 lb) Height: 5' 10.5\" (1.791 m) Physical Exam: 
  
General appearance: Alert, well appearing, and in no distress Eyes: Sclerae anicteric ENT: Hearing grossly normal bilaterally CV: RR, paced, distant S1, S2, no M/R/G, no JVD, normal distal pulses, no lower extremity edema Pulm: Clear to auscultation, no wheezes, no crackles, no accessory muscle use GI: Soft, nontender, nondistended Cardiographics Telemetry:  
ECG (Indpendently visualized and interpreted): 
Echocardiogram:  
 
Labs:  
Recent Labs  
  11/27/18 
1214   
K 4.2 BUN 34* CREA 1.46  
* WBC 8.2 HGB 15.4 HCT 47.0 * INR 1.7 Assessment:  
  
Active Problems: 
  Ischemic cardiomyopathy (6/3/2014) Biventricular ICD (implantable cardioverter-defibrillator) in place (6/3/2014) Chronic systolic CHF (congestive heart failure) ([facility] [address] (4/28/2016) Persistent atrial fibrillation ([facility] [address] (5/6/2016) Overview: 1. Attempt A. Fib ablation at [facility] - 2015 2. CV - Tikosyn - May 2016 Plan: 1.  ICD implantation.  Stable BiV ICD device, chronic high atrial threshold, cont device checks 
  
2.  Atrial fibrillation.  Able to maintain NSR Post A. Fib ablation done at [facility]. Post CV and Tikosyn loading.  
  
3.  Ventricular tachycardia.  His device has shown no recurrent events. 4.  C/S CHF, NYHA class III - Stable EF 15-20%. On Coreg and lisinopril 
  
5.  CVA protection: Mr. Brian Herbert is a pleasant 70 yo with a history of afib and recurrent hematuria and multiple intolerances to anticoagulation (severe dizziness with xarelto, allergic reaction to pradaxa) and is an excellent candidate for an alternative to oral anticoagulation and presents today for scheduled Watchman implantation. Answered all questions and concerns and patient wishes to proceed. --CHADSVasc = 5 
     --HASBLED = 4 Perla Obregon MD, MS 
Cardiology/Electrophysiology

## 2018-11-28 NOTE — PROGRESS NOTES
Patient received to [address] [place] [address] room # 9  Ambulatory from [facility]. Patient scheduled for Watchman today with Dr Naren Ahn. Procedure reviewed & questions answered, voiced good understanding consent obtained & placed on chart. All medications and medical history reviewed. Will prep patient per orders. Patient & family updated on plan of care. The patient has a fraility score of 3-MANAGING WELL, based on reports SOB with exertion

## 2018-11-28 NOTE — PROGRESS NOTES
TRANSFER - IN REPORT: 
 
Verbal report received from [facility], RN on [address] being received from Cath Lab for routine progression of care Report consisted of patients Situation, Background, Assessment and Recommendations(SBAR). Information from the following report(s) SBAR was reviewed with the receiving nurse. Opportunity for questions and clarification was provided. Assessment completed upon patients arrival to unit and care assumed.

## 2018-11-28 NOTE — OP NOTES
[address] [facility] REPORT    Patient: Sarahi Ervin MRN: [id number]  SSN: [id number]    YOB: 1940  Age: 69 y.o. Sex: male      DATE OF PROCEDURE: 11/28/2018     PROCEDURE: Left atrial appendage occlusion with Watchman device. Pre-Procedure Diagnosis  1. Atrial fibrillation  2. Intolerant to long term anticoagulation     Procedure Performed  1. Transesophageal echocardiogram  2. Transeptal puncture   3. Watchman implantation    Cardiac Electrophysiologist: Prashanth Vera. Hershall Bumpers, MD  Interventional Cardiologist: Casandra Martinez MD    Anesthesia: General     Estimated Blood Loss: Less than 10 mL     Specimens: * No specimens in log *    Procedure in Detail:  The patient was brought to the [facility] OR in the fasting state. The patient was intubated by anesthesiology, invasive arterial blood pressure monitoring obtained, a shirley catheter inserted. A tranesophageal echocardiogram was performed directly prior to the procedure and was negative for a MIRANDA thrombus (see full report in chart). As the secondary , I obtained venous access under ultrasound guidance x 1 using modified Seldinger technique with placement of a 16Fr short sidearm sheath into the right femoral vein after deployment of Perclose device in \"preclose\" fashion. I placed an SL-O 63cm long braided sheath through the 16 Fr sheath in the right femoral vein and guided over a wire to the RA. Unable to visualize septum adequately with AMBER. At this point an 11 English sheath was placed in left femoral vein and \"preclosed\". A ICE was advanced and  used to better visualize septum. Next, I inserted a trans-septal needle into the SLO and it was used to perform a trans-septal puncture with assistance from AMBER, as well as fluoroscopy. The SLO sheath was advanced into the LA.  Total weight based heparin bolus was administered (1/2 prior to transeptal puncture and 1/2 just after transeptal access) and systemic blood pressure monitored invasively. The ACT target was 250-300. As the primary implanting physician, Dr. Forrest Mina prepped the Watchman delivery sheath and delivered the device as outlined in his procedure note. I was present and assisted him with this portion of the procedure. At the completion of the watchman delivery, all catheters were removed, the both sheaths was then removed and I deployed the Perclose device with good hemostasis. The patient tolerated the procedure well with no acute complications recognized. Just prior to pulling shealths, the AMBER was used to obtain ultrasound images and revealed no evidence of pericardial effusion. Complications: None    Summary:   1. Successful Watchman implantation of a 27 mm device.       Gabby Dejesus MD

## 2018-11-28 NOTE — PROGRESS NOTES
Bedside and Verbal shift change report given to self (oncoming nurse) by Shiela Smith (offgoing nurse). Report included the following information SBAR and Kardex.

## 2018-11-28 NOTE — DISCHARGE INSTRUCTIONS
Watchman Discharge Instructions      Activity:     Follow your doctors recommendations   Return to normal activities gradually, pacing yourself as you feel better, resting when tired. · Activity should be limited for the next 48 hours. Climb stairs as little as possible and avoid any stooping, bending or strenuous activity for 48 hours. No heavy lifting (anything over 10 pounds) for three days. · Do not drive for 48 hoursHave a responsible person drive you home and stay with you for at least 24 hours after your heart catheterization/angiography. · Check the puncture site frequently for swelling or bleeding. If you see any bleeding, lie down and apply pressure over the area with a clean town or washcloth. Notify your doctor for any redness, swelling, drainage or oozing from the puncture site. Notify your doctor for any fever or chills. · You may resume your usual diet. Drink more fluids than usual.        Incision / Wound Care       Cleanse wounds with mild soap and water. Keep wound dry.  A small amount of bloody or clear drainage is normal.   Watch for redness, swelling, incision site hot to touch, foul or colored drainage from the incision site, these are all signs of infection and should be reported immediately to the physicians.  If there is site concern please notify your implanting physician.  You may remove the bandage from your Right and Groin in 24 hours. You may shower in 24 hours. No tub baths, hot tubs or swimming for one week. Do not place any lotions, creams, powders, ointments over the puncture site for one week. You may place a clean band-aid over the puncture site each day for 5 days. Change this daily. Continued        Medications:   DO NOT STOP TAKING YOUR WARFARIN OR ASPIRIN  (and/or PLAVIX) WITHOUT SPEAKING WITH YOUR CARDIOLOGIST FIRST!  Take your medications as ordered at the time of discharge.    Do NOT stop taking any medications without first discussing it with your doctor.  Notify all your doctors of current medication lists. Follow instructions on medication administration especially if blood thinning medications are prescribed. Your doctor will monitor your medications and advise you when or if you can stop taking them. Notify your doctor immediately if any of the following:   Sudden weight gain   Increasing shortness of breath   Pain, change in color, temperature or swelling in lower legs or feet.  Fevers greater than 101 degrees, redness, swelling, incision site hot to touch, foul or colored drainage from the incision site, these are all signs of infection and should be reported immediately to the physicians. Post Watchman Discharge Instructions Timeline       After a minimum of 45 days from date of procedure you will need to return to hospital to have an outpatient AMBER performed to determine if the implant has closed the opening of the appendage. At this time you will see the [place] [id number]. Coordinator for a follow up. If the AMBER reveals the appendage is not fully closed - you will require another AMBER at a later date to reassess. Once the results from AMBER have been reviewed the physicians will determine what medication changes need to be made. Your [facility] Coordinator can facilitate arranging these appointments.  6 months post implant you will need to see the [facility] Coordinator and visit the physician for a routine follow up and potentially EKG, and lab work. Your Coordinator can facilitate with setting up these appointments.  At 1 year and 2 years post implant you will be contacted by your [place] [id number]. Coordinator for a brief interview. This allows us to complete required patient monitoring.        Prior to any dental work or surgery notify your dentist or surgeon about your Watchman implant and the medications you are on.     Maintain regular follow up visits with your cardiologist     Keep all bloodwork appointments. Thank you for entrusting us with your care!

## 2018-11-29 VITALS
TEMPERATURE: 98.6 F | SYSTOLIC BLOOD PRESSURE: 121 MMHG | HEART RATE: 60 BPM | HEIGHT: 71 IN | WEIGHT: 189.8 LBS | DIASTOLIC BLOOD PRESSURE: 66 MMHG | OXYGEN SATURATION: 96 % | RESPIRATION RATE: 17 BRPM | BODY MASS INDEX: 26.57 KG/M2

## 2018-11-29 LAB
ANION GAP SERPL CALC-SCNC: 9 MMOL/L (ref 7–16)
BUN SERPL-MCNC: 25 MG/DL (ref 8–23)
CALCIUM SERPL-MCNC: 9.1 MG/DL (ref 8.3–10.4)
CHLORIDE SERPL-SCNC: 106 MMOL/L (ref 98–107)
CO2 SERPL-SCNC: 23 MMOL/L (ref 21–32)
CREAT SERPL-MCNC: 1.33 MG/DL (ref 0.8–1.5)
ERYTHROCYTE [DISTWIDTH] IN BLOOD BY AUTOMATED COUNT: 15 % (ref 11.9–14.6)
GLUCOSE SERPL-MCNC: 85 MG/DL (ref 65–100)
HCT VFR BLD AUTO: 38.8 % (ref 41.1–50.3)
HGB BLD-MCNC: 12.8 G/DL (ref 13.6–17.2)
MAGNESIUM SERPL-MCNC: 2.3 MG/DL (ref 1.8–2.4)
MCH RBC QN AUTO: 32.7 PG (ref 26.1–32.9)
MCHC RBC AUTO-ENTMCNC: 33 G/DL (ref 31.4–35)
MCV RBC AUTO: 99.2 FL (ref 79.6–97.8)
NRBC # BLD: 0 K/UL (ref 0–0.2)
PLATELET # BLD AUTO: 124 K/UL (ref 150–450)
PMV BLD AUTO: 11.4 FL (ref 9.4–12.3)
POTASSIUM SERPL-SCNC: 4.3 MMOL/L (ref 3.5–5.1)
RBC # BLD AUTO: 3.91 M/UL (ref 4.23–5.6)
SODIUM SERPL-SCNC: 138 MMOL/L (ref 136–145)
WBC # BLD AUTO: 11.6 K/UL (ref 4.3–11.1)

## 2018-11-29 PROCEDURE — 74011250637 HC RX REV CODE- 250/637: Performed by: INTERNAL MEDICINE

## 2018-11-29 PROCEDURE — 80048 BASIC METABOLIC PNL TOTAL CA: CPT

## 2018-11-29 PROCEDURE — 85027 COMPLETE CBC AUTOMATED: CPT

## 2018-11-29 PROCEDURE — 36415 COLL VENOUS BLD VENIPUNCTURE: CPT

## 2018-11-29 PROCEDURE — 83735 ASSAY OF MAGNESIUM: CPT

## 2018-11-29 RX ORDER — DOFETILIDE 0.25 MG/1
250 CAPSULE ORAL 2 TIMES DAILY
Qty: 60 CAP | Refills: 3 | Status: SHIPPED | OUTPATIENT
Start: 2018-11-29 | End: 2018-12-29

## 2018-11-29 RX ORDER — DOFETILIDE 0.25 MG/1
250 CAPSULE ORAL 2 TIMES DAILY
Qty: 28 CAP | Refills: 0 | Status: SHIPPED | OUTPATIENT
Start: 2018-11-29 | End: 2018-11-29

## 2018-11-29 RX ADMIN — ASPIRIN 81 MG 81 MG: 81 TABLET ORAL at 09:01

## 2018-11-29 RX ADMIN — FINASTERIDE 5 MG: 5 TABLET, FILM COATED ORAL at 09:02

## 2018-11-29 RX ADMIN — SPIRONOLACTONE 25 MG: 25 TABLET ORAL at 09:02

## 2018-11-29 RX ADMIN — Medication 5 ML: at 05:22

## 2018-11-29 RX ADMIN — LEVOTHYROXINE SODIUM 125 MCG: 125 TABLET ORAL at 09:01

## 2018-11-29 RX ADMIN — HYDROCODONE BITARTRATE AND ACETAMINOPHEN 1 TABLET: 5; 325 TABLET ORAL at 04:09

## 2018-11-29 RX ADMIN — DOFETILIDE 250 MCG: 0.25 CAPSULE ORAL at 09:01

## 2018-11-29 RX ADMIN — CARVEDILOL 12.5 MG: 12.5 TABLET, FILM COATED ORAL at 09:01

## 2018-11-29 NOTE — PROGRESS NOTES
Verbal bedside report given to oncoming RNPiedad. Patient's situation, background, assessment and recommendations provided. Opportunity for questions provided. Oncoming RN assumed care of patient.

## 2018-11-29 NOTE — PROGRESS NOTES
Bedside and Verbal shift change report given to Stephen Penaloza RN (oncoming nurse) by self (offgoing nurse). Report included the following information SBAR, Kardex, MAR and Recent Results.

## 2018-11-29 NOTE — DISCHARGE SUMMARY
[facility] Cardiology Discharge Summary     Patient ID:  La Augustine  [id number]  13 y.o.  1940    Admit date: 11/28/2018    Discharge date:  11/29/2018    Admitting Physician: Serena Gonzalez MD     Discharge Physician: Dr. Sabrina Maguire    Admission Diagnoses: Paroxysmal atrial fibrillation [facility]) [I48.0]    Discharge Diagnoses:    Diagnosis    Arrhythmia    Hx of CABG    Long-term use of high-risk medication    Persistent atrial fibrillation (HCC)    Atrial fibrillation (HCC)    Chronic systolic CHF (congestive heart failure) (HCC)    Atypical atrial flutter (HCC)    Hyperlipidemia    Essential hypertension, benign    Renal insufficiency    Paroxysmal ventricular tachycardia (HCC)    Biventricular ICD (implantable cardioverter-defibrillator) in place    Hypothyroidism       Cardiology Procedures this admission:  AMBER, Implantation of Watchman device, Echocardiogram  Consults: None    Hospital Course: Patient was seen at the office of [facility] Cardiology by Dr. Sabrina Maguire in follow up. The patient was felt to be an appropriate candidate for Watchman device. The patient presented for procedure. AMBER was performed directly prior to procedure that was negative for MIRANDA thrombus. The patient underwent successful implantation of a 27mm Watchman device. The patient tolerated the procedure well and was monitored closely. The morning of 11/29/18, patient was up feeling well without any complaints of chest pain or shortness of breath. Patient's labs were stable with slightly elevated WBC w/o fever, chills, or s/s of infection. Patient was seen and examined by Dr. Sabrina Maguire and determined stable and ready for discharge. Patient was instructed on the importance of medication compliance. He will remain on ASA and Xarelto for at least 45 days. CBC w/ diff will be drawn in 3-5. The patient will have repeat AMBER performed in 45 days.      DISPOSITION: The patient is being discharged home in stable condition on a low saturated fat, low cholesterol and low salt diet. The patient is instructed to advance activities as tolerated to the limit of fatigue or shortness of breath. The patient is instructed to avoid lifting anything heavier than 10 lbs for 1 week. The patient is instructed to avoid any straining, stooping or squatting for 2 days. The patient is instructed not to drive for 2 days. The patient is instructed to watch the groin site for bleeding/oozing; if seen, the patient is instructed to apply firm pressure with a clean cloth and call [facility] Cardiology at [phone]. The patient is instructed to watch for signs of infection which include: increasing area of redness, fever/hot to touch or purulent drainage at the groin site. The patient is instructed not to soak in a bathtub for 7-10 days, but is cleared to shower. The patient is instructed to return to the ER immediately for any severe pain, color change, or temperature change in leg. The patient is informed not to stop any medications without discussing with our office and to contact our office if any dental work or possible surgeries are expected.      Discharge Exam:   Visit Vitals  /73   Pulse 66   Temp 98.6 °F (37 °C)   Resp 17   Ht 5' 10.5\" (1.791 m)   Wt 86.1 kg (189 lb 12.8 oz)   SpO2 96%   BMI 26.85 kg/m²         Physical Exam:  General: Well Developed, Well Nourished, No Acute Distress, Alert & Oriented  Neck: supple, no JVD  Heart: S1S2 with RRR without murmurs or gallops  Lungs: Clear throughout auscultation bilaterally without adventitious sounds  Abd: soft, nontender, nondistended, with good bowel sounds  Ext: warm, no edema, calves supple/nontender, pulses 2+ bilaterally  Right and left groin sites: clean, dry, and intact without bruits, hematoma, or bleeding  Skin: warm and dry      Recent Results (from the past 24 hour(s))   EKG, 12 LEAD, INITIAL    Collection Time: 11/28/18 10:32 AM   Result Value Ref Range Ventricular Rate 110 BPM    Atrial Rate 67 BPM    QRS Duration 174 ms    Q-T Interval 302 ms    QTC Calculation (Bezet) 408 ms    Calculated R Axis -90 degrees    Calculated T Axis -22 degrees    Diagnosis       Ventricular-paced rhythm  Biventricular pacemaker detected  Abnormal ECG  When compared with ECG of 05-NOV-2018 13:27,  Vent. rate has decreased BY  10 BPM  Confirmed by [facility]  MD ()AKIN ([id number]) on 11/28/2018 11:43:14   AM     POC ACTIVATED CLOTTING TIME    Collection Time: 11/28/18 12:23 PM   Result Value Ref Range    Activated Clotting Time (POC) 290 (H) 70 - 781 SECS   METABOLIC PANEL, BASIC    Collection Time: 11/29/18  4:00 AM   Result Value Ref Range    Sodium 138 136 - 145 mmol/L    Potassium 4.3 3.5 - 5.1 mmol/L    Chloride 106 98 - 107 mmol/L    CO2 23 21 - 32 mmol/L    Anion gap 9 7 - 16 mmol/L    Glucose 85 65 - 100 mg/dL    BUN 25 (H) 8 - 23 MG/DL    Creatinine 1.33 0.8 - 1.5 MG/DL    GFR est AA >60 >60 ml/min/1.73m2    GFR est non-AA 55 (L) >60 ml/min/1.73m2    Calcium 9.1 8.3 - 10.4 MG/DL   MAGNESIUM    Collection Time: 11/29/18  4:00 AM   Result Value Ref Range    Magnesium 2.3 1.8 - 2.4 mg/dL   CBC W/O DIFF    Collection Time: 11/29/18  4:00 AM   Result Value Ref Range    WBC 11.6 (H) 4.3 - 11.1 K/uL    RBC 3.91 (L) 4.23 - 5.6 M/uL    HGB 12.8 (L) 13.6 - 17.2 g/dL    HCT 38.8 (L) 41.1 - 50.3 %    MCV 99.2 (H) 79.6 - 97.8 FL    MCH 32.7 26.1 - 32.9 PG    MCHC 33.0 31.4 - 35.0 g/dL    RDW 15.0 (H) 11.9 - 14.6 %    PLATELET 542 (L) 526 - 450 K/uL    MPV 11.4 9.4 - 12.3 FL    ABSOLUTE NRBC 0.00 0.0 - 0.2 K/uL         Patient Instructions:     Current Discharge Medication List      CONTINUE these medications which have CHANGED    Details   dofetilide (TIKOSYN) 250 mcg capsule Take 1 Cap by mouth two (2) times a day for 14 days.   Qty: 28 Cap, Refills: 0         CONTINUE these medications which have NOT CHANGED    Details   omega-3 fatty acids-vitamin e 2,000-650-12 mg/2.5 gram elpk Take 2,000 mg by mouth daily. furosemide (LASIX) 80 mg tablet Take 1 Tab by mouth daily. If light headedness occurs, take 0.5 tablet daily. Qty: 90 Tab, Refills: 3    Associated Diagnoses: Ischemic cardiomyopathy      losartan (COZAAR) 25 mg tablet Take 1 Tab by mouth daily. Indications: hypertension  Qty: 30 Tab, Refills: 3      carvedilol (COREG) 12.5 mg tablet Take 1 Tab by mouth two (2) times daily (with meals). Qty: 180 Tab, Refills: 3    Associated Diagnoses: [facility] systolic [facility]) [facility] Persistent atrial fibrillation (HCC)      XARELTO 20 mg tab tablet TAKE 1 TABLET BY MOUTH EVERY DAY  Qty: 90 Tab, Refills: 1    Associated Diagnoses: Atrial fibrillation, unspecified type (HCC)      spironolactone (ALDACTONE) 25 mg tablet Take 1 Tab by mouth daily. Qty: 90 Tab, Refills: 3    Associated Diagnoses: Ischemic cardiomyopathy      ALPRAZolam (XANAX) 0.5 mg tablet Take 0.5 mg by mouth nightly as needed. Refills: 2      finasteride (PROSCAR) 5 mg tablet Take 5 mg by mouth daily. Refills: 4      multivitamin (ONE A DAY) tablet Take 1 Tab by mouth daily. OTHER daily. Tumeric daily      aspirin 81 mg chewable tablet Take 81 mg by mouth daily. levothyroxine (SYNTHROID) 125 mcg tablet Take 125 mcg by mouth Daily (before breakfast). dutasteride (AVODART) 0.5 mg capsule Take 0.5 mg by mouth daily.              Lucie Gutierrez NP  11/29/18  8:35 AM

## 2018-11-29 NOTE — PROGRESS NOTES
Care Management Interventions PCP Verified by CM: Yes(2-3 months ago ) Palliative Care Criteria Met (RRAT>21 & CHF Dx)?: No(RRAT 23 Dx Atrial fib ) Mode of Transport at Discharge: Other (see comment)(family) Transition of Care Consult (CM Consult): Discharge Planning Discharge Durable Medical Equipment: No 
Physical Therapy Consult: No 
Occupational Therapy Consult: No 
Speech Therapy Consult: No 
Current Support Network: Other(lives with his significant other Tye Elliott) Confirm Follow Up Transport: Family Plan discussed with Pt/Family/Caregiver: Yes Freedom of Choice Offered: Yes Discharge Location Discharge Placement: Home Met with patient prior to d/c. Patient alert and oriented x 3, independent of ADL's and lives with his significant other Tye Elliott. He requires no DME and is able to drive. He has Medicare and [employer] for insurance and obtains his medications at [facility] in [facility]. PCP is Isac Barnes who he saw 2-3 months ago. He voices no concerns or needs for d/c. Patient to d/c home with his significant other.

## 2018-11-29 NOTE — PROGRESS NOTES
[facility] CARDIOLOGY PROGRESS NOTE 
      
 
11/29/2018 6:54 AM 
 
Admit Date: 11/28/2018 Admit Diagnosis: Paroxysmal atrial fibrillation ([facility] [address] [I48.0] Subjective: No complaints this AM, no chest pain or shortness of breath Interval History: (History of pertinent interval events obtained from nursing staff) S/p watchman yesterday without immediate complications ROS: 
GEN:  No fever or chills Cardiovascular:  As noted above Pulmonary:  As noted above Neuro:  No new focal motor or sensory loss Objective:  
 
Vitals:  
 11/28/18 2126 11/29/18 0049 11/29/18 0409 11/29/18 1452 BP: 104/63 104/63  107/54 Pulse: 65 66  60 Resp: 16 18 18 Temp: 97.8 °F (36.6 °C) 98 °F (36.7 °C)  97.9 °F (36.6 °C) SpO2: 93% 94%  94% Weight:   86.1 kg (189 lb 12.8 oz) Height:      
 
 
Physical Exam: 
[place], Well Nourished, No Acute Distress, Alert & Oriented x 3, appropriate mood. Neck- supple, no JVD 
CV- regular rate paced rhythm, +SM Lung- clear bilaterally Abd- soft, nontender, nondistended Ext- no edema bilaterally, B/L femoral access sites without hematoma or bruits Skin- warm and dry Current Facility-Administered Medications Medication Dose Route Frequency  ALPRAZolam (XANAX) tablet 0.5 mg  0.5 mg Oral QHS PRN  
 aspirin chewable tablet 81 mg  81 mg Oral DAILY  carvedilol (COREG) tablet 12.5 mg  12.5 mg Oral BID WITH MEALS  finasteride (PROSCAR) tablet 5 mg  5 mg Oral DAILY  furosemide (LASIX) tablet 40 mg  40 mg Oral EVERY OTHER DAY  levothyroxine (SYNTHROID) tablet 125 mcg  125 mcg Oral ACB  losartan (COZAAR) tablet 25 mg  25 mg Oral DAILY  spironolactone (ALDACTONE) tablet 25 mg  25 mg Oral DAILY  rivaroxaban (XARELTO) tablet 20 mg  20 mg Oral DAILY WITH DINNER  
 sodium chloride (NS) flush 5-10 mL  5-10 mL IntraVENous Q8H  
 sodium chloride (NS) flush 5-10 mL  5-10 mL IntraVENous PRN  
  acetaminophen (TYLENOL) tablet 650 mg  650 mg Oral Q4H PRN  
 HYDROcodone-acetaminophen (NORCO) 5-325 mg per tablet 1 Tab  1 Tab Oral Q4H PRN  
 dofetilide (TIKOSYN) capsule 250 mcg  250 mcg Oral BID Data Review:  
Recent Results (from the past 24 hour(s)) EKG, 12 LEAD, INITIAL Collection Time: 11/28/18 10:32 AM  
Result Value Ref Range Ventricular Rate 110 BPM  
 Atrial Rate 67 BPM  
 QRS Duration 174 ms Q-T Interval 302 ms QTC Calculation (Bezet) 408 ms Calculated R Axis -90 degrees Calculated T Axis -22 degrees Diagnosis Ventricular-paced rhythm Biventricular pacemaker detected Abnormal ECG When compared with ECG of 05-NOV-2018 13:27, 
Vent. rate has decreased BY  10 BPM 
Confirmed by [facility]  MD ()AKIN ([id number]) on 11/28/2018 11:43:14 AM 
  
POC ACTIVATED CLOTTING TIME Collection Time: 11/28/18 12:23 PM  
Result Value Ref Range Activated Clotting Time (POC) 290 (H) 70 - 489 SECS  
METABOLIC PANEL, BASIC Collection Time: 11/29/18  4:00 AM  
Result Value Ref Range Sodium 138 136 - 145 mmol/L Potassium 4.3 3.5 - 5.1 mmol/L Chloride 106 98 - 107 mmol/L  
 CO2 23 21 - 32 mmol/L Anion gap 9 7 - 16 mmol/L Glucose 85 65 - 100 mg/dL BUN 25 (H) 8 - 23 MG/DL Creatinine 1.33 0.8 - 1.5 MG/DL  
 GFR est AA >60 >60 ml/min/1.73m2 GFR est non-AA 55 (L) >60 ml/min/1.73m2 Calcium 9.1 8.3 - 10.4 MG/DL MAGNESIUM Collection Time: 11/29/18  4:00 AM  
Result Value Ref Range Magnesium 2.3 1.8 - 2.4 mg/dL CBC W/O DIFF Collection Time: 11/29/18  4:00 AM  
Result Value Ref Range WBC 11.6 (H) 4.3 - 11.1 K/uL  
 RBC 3.91 (L) 4.23 - 5.6 M/uL  
 HGB 12.8 (L) 13.6 - 17.2 g/dL HCT 38.8 (L) 41.1 - 50.3 % MCV 99.2 (H) 79.6 - 97.8 FL  
 MCH 32.7 26.1 - 32.9 PG  
 MCHC 33.0 31.4 - 35.0 g/dL  
 RDW 15.0 (H) 11.9 - 14.6 % PLATELET 477 (L) 410 - 450 K/uL MPV 11.4 9.4 - 12.3 FL ABSOLUTE NRBC 0.00 0.0 - 0.2 K/uL EKG:  (EKG has been independently visualized by me with interpretation below) Assessment:  
 
Active Problems: 
  A-fib ([address] (11/28/2018) Ischemic cardiomyopathy (6/3/2014) Biventricular ICD (implantable cardioverter-defibrillator) in place (6/3/2014) Chronic systolic CHF (congestive heart failure) ([address] (4/28/2016) Persistent atrial fibrillation ([address] (5/6/2016) Overview: 1. Attempt A. Fib ablation at [facility] - 2015 2. CV - [facility] - May 2016 Plan:  
1. Watchman implantation:  Pt underwent Watchman yesterday without immediate complication, no questions or concerns this AM, ready and stable for discharge with appropriate follow up. Cont ASA and xarelto 2. Dispo: home today with 45 day AMBER follow up Nacny Obregon MD 
Cardiology/Electrophysiology

## 2018-11-29 NOTE — DISCHARGE SUMMARY
[facility] Cardiology Discharge Summary     Patient ID:  Aleshia Mars  [id number]  89 y.o.  1940    Admit date: 11/28/2018    Discharge date:  11/29/2018    Admitting Physician: Edie Santiago MD     Discharge Physician: Dr. Mariana Thacker    Admission Diagnoses: Paroxysmal atrial fibrillation [facility]) [I48.0]    Discharge Diagnoses:    Diagnosis    Arrhythmia    Hx of CABG    Long-term use of high-risk medication    Persistent atrial fibrillation (HCC)    Atrial fibrillation (HCC)    Chronic systolic CHF (congestive heart failure) (HCC)    Atypical atrial flutter (HCC)    Hyperlipidemia    Essential hypertension, benign    Renal insufficiency    Paroxysmal ventricular tachycardia (HCC)    Biventricular ICD (implantable cardioverter-defibrillator) in place    Hypothyroidism       Cardiology Procedures this admission:  AMBER, Implantation of Watchman device, Echocardiogram  Consults: None    Hospital Course: Patient was seen at the office of [facility] Cardiology by Dr. Mariana Thacker in follow up. The patient was felt to be an appropriate candidate for Watchman device. The patient presented for procedure. AMBER was performed directly prior to procedure that was negative for MIRANDA thrombus. The patient underwent successful implantation of a 27mm Watchman device. The patient tolerated the procedure well and was monitored closely. The morning of 11/29/18, patient was up feeling well without any complaints of chest pain or shortness of breath. Patient's labs were stable with slightly elevated WBC w/o fever, chills, or s/s of infection. Patient was seen and examined by Dr. Mariana Thacker and determined stable and ready for discharge. Patient was instructed on the importance of medication compliance. He will remain on ASA and Xarelto for at least 45 days. CBC w/ diff will be drawn in 3-5. The patient will have repeat AMBER performed in 45 days.      DISPOSITION: The patient is being discharged home in stable condition on a low saturated fat, low cholesterol and low salt diet. The patient is instructed to advance activities as tolerated to the limit of fatigue or shortness of breath. The patient is instructed to avoid lifting anything heavier than 10 lbs for 1 week. The patient is instructed to avoid any straining, stooping or squatting for 2 days. The patient is instructed not to drive for 2 days. The patient is instructed to watch the groin site for bleeding/oozing; if seen, the patient is instructed to apply firm pressure with a clean cloth and call [facility] Cardiology at [phone]. The patient is instructed to watch for signs of infection which include: increasing area of redness, fever/hot to touch or purulent drainage at the groin site. The patient is instructed not to soak in a bathtub for 7-10 days, but is cleared to shower. The patient is instructed to return to the ER immediately for any severe pain, color change, or temperature change in leg. The patient is informed not to stop any medications without discussing with our office and to contact our office if any dental work or possible surgeries are expected.      Discharge Exam:   Visit Vitals  /66   Pulse 60   Temp 98.6 °F (37 °C)   Resp 17   Ht 5' 10.5\" (1.791 m)   Wt 86.1 kg (189 lb 12.8 oz)   SpO2 96%   BMI 26.85 kg/m²         Physical Exam:  General: Well Developed, Well Nourished, No Acute Distress, Alert & Oriented  Neck: supple, no JVD  Heart: S1S2 with RRR without murmurs or gallops  Lungs: Clear throughout auscultation bilaterally without adventitious sounds  Abd: soft, nontender, nondistended, with good bowel sounds  Ext: warm, no edema, calves supple/nontender, pulses 2+ bilaterally  Right and left groin sites: clean, dry, and intact without bruits, hematoma, or bleeding  Skin: warm and dry      Recent Results (from the past 24 hour(s))   EKG, 12 LEAD, INITIAL    Collection Time: 11/28/18 10:32 AM   Result Value Ref Range Ventricular Rate 110 BPM    Atrial Rate 67 BPM    QRS Duration 174 ms    Q-T Interval 302 ms    QTC Calculation (Bezet) 408 ms    Calculated R Axis -90 degrees    Calculated T Axis -22 degrees    Diagnosis       Ventricular-paced rhythm  Biventricular pacemaker detected  Abnormal ECG  When compared with ECG of 05-NOV-2018 13:27,  Vent. rate has decreased BY  10 BPM  Confirmed by [facility]  MD ()AKIN ([id number]) on 11/28/2018 11:43:14   AM     POC ACTIVATED CLOTTING TIME    Collection Time: 11/28/18 12:23 PM   Result Value Ref Range    Activated Clotting Time (POC) 290 (H) 70 - 563 SECS   METABOLIC PANEL, BASIC    Collection Time: 11/29/18  4:00 AM   Result Value Ref Range    Sodium 138 136 - 145 mmol/L    Potassium 4.3 3.5 - 5.1 mmol/L    Chloride 106 98 - 107 mmol/L    CO2 23 21 - 32 mmol/L    Anion gap 9 7 - 16 mmol/L    Glucose 85 65 - 100 mg/dL    BUN 25 (H) 8 - 23 MG/DL    Creatinine 1.33 0.8 - 1.5 MG/DL    GFR est AA >60 >60 ml/min/1.73m2    GFR est non-AA 55 (L) >60 ml/min/1.73m2    Calcium 9.1 8.3 - 10.4 MG/DL   MAGNESIUM    Collection Time: 11/29/18  4:00 AM   Result Value Ref Range    Magnesium 2.3 1.8 - 2.4 mg/dL   CBC W/O DIFF    Collection Time: 11/29/18  4:00 AM   Result Value Ref Range    WBC 11.6 (H) 4.3 - 11.1 K/uL    RBC 3.91 (L) 4.23 - 5.6 M/uL    HGB 12.8 (L) 13.6 - 17.2 g/dL    HCT 38.8 (L) 41.1 - 50.3 %    MCV 99.2 (H) 79.6 - 97.8 FL    MCH 32.7 26.1 - 32.9 PG    MCHC 33.0 31.4 - 35.0 g/dL    RDW 15.0 (H) 11.9 - 14.6 %    PLATELET 470 (L) 250 - 450 K/uL    MPV 11.4 9.4 - 12.3 FL    ABSOLUTE NRBC 0.00 0.0 - 0.2 K/uL         Patient Instructions:     Current Discharge Medication List      CONTINUE these medications which have CHANGED    Details   dofetilide (TIKOSYN) 250 mcg capsule Take one cap by mouth 2 times daily. CONTINUE these medications which have NOT CHANGED    Details   omega-3 fatty acids-vitamin e 2,000-650-12 mg/2.5 gram elpk Take 2,000 mg by mouth daily. furosemide (LASIX) 80 mg tablet Take 1 Tab by mouth daily. If light headedness occurs, take 0.5 tablet daily. Qty: 90 Tab, Refills: 3    Associated Diagnoses: Ischemic cardiomyopathy      losartan (COZAAR) 25 mg tablet Take 1 Tab by mouth daily. Indications: hypertension  Qty: 30 Tab, Refills: 3      carvedilol (COREG) 12.5 mg tablet Take 1 Tab by mouth two (2) times daily (with meals). Qty: 180 Tab, Refills: 3    Associated Diagnoses: Chronic systolic CHF (congestive heart failure) ([facility] [address] Persistent atrial fibrillation (HCC)      XARELTO 20 mg tab tablet TAKE 1 TABLET BY MOUTH EVERY DAY  Qty: 90 Tab, Refills: 1    Associated Diagnoses: Atrial fibrillation, unspecified type (HCC)      spironolactone (ALDACTONE) 25 mg tablet Take 1 Tab by mouth daily. Qty: 90 Tab, Refills: 3    Associated Diagnoses: Ischemic cardiomyopathy      ALPRAZolam (XANAX) 0.5 mg tablet Take 0.5 mg by mouth nightly as needed. Refills: 2      finasteride (PROSCAR) 5 mg tablet Take 5 mg by mouth daily. Refills: 4      multivitamin (ONE A DAY) tablet Take 1 Tab by mouth daily. OTHER daily. Tumeric daily      aspirin 81 mg chewable tablet Take 81 mg by mouth daily. levothyroxine (SYNTHROID) 125 mcg tablet Take 125 mcg by mouth Daily (before breakfast). dutasteride (AVODART) 0.5 mg capsule Take 0.5 mg by mouth daily.              Beryle Chancy, NP  11/29/18  8:35 AM

## 2018-11-30 ENCOUNTER — PATIENT OUTREACH (OUTPATIENT)
Dept: CASE MANAGEMENT | Age: 78
End: 2018-11-30

## 2018-11-30 NOTE — PROGRESS NOTES
This note will not be viewable in [address] Date/Time of Call: 
 11/30/18 1118am  
What was the patient hospitalized for? Persistent AFIB Consent for BRENNON AMADO Call Does the patient understand his/her diagnosis and/or treatment and what happened during the hospitalization? Spoke with patient and he agrees to call Patient states that he understands the treatment and what happened in the hospital  
Did the patient receive discharge instructions? Yes  
CM Assessed Risk for Readmission:  
 
 
Patient stated Risk for Readmission:  
 
 Patient is a low risk for readmission per Care Coordinator assessment Patients states that the she is not concerned about readmission Review any discharge instructions (see discharge instructions/AVS in [facility]). Ask patient if they understand these. Do they have any questions? DC instructions reviewed and patient verbalizes understanding Were home services ordered (nursing, PT, OT, ST, etc.)? No   
If so, has the first visit occurred? If not, why? (Assist with coordination of services if necessary. ) 
 NA Was any DME ordered? No   
If so, has it been received? If not, why?  (Assist patient in obtaining DME orders &/or equipment if necessary. ) NA Complete a review of all medications (new, continued and discontinued meds per the D/C instructions and medication tab in [facility]). Patient declined review of medications as nothing has changed Were all new prescriptions filled? If not, why?  (Assist patient in obtaining medications if necessary  escalate for CCM &/or SW if ongoing issues are verbalized by pt or anticipated) NA Does the patient understand the purpose and dosing instructions for all medications? (If patient has questions, provide explanation and education.) Patient states that he understands all of his medications Does the patient have any problems in performing ADLs? (If patient is unable to perform ADLs  what is the limiting factor(s)? Do they have a support system that can assist? If no support system is present, discuss possible assistance that they may be able to obtain. Escalate for CCM/SW if ongoing issues are verbalized by pt or anticipated) Patient states that he is independent with ADLs Does the patient have all follow-up appointments scheduled? 7 day f/up with PCP?  
(f/up with PCP may be w/in 14 days if patient has a f/up with their specialist w/in 7 days) 7-14 day f/up with specialist?  
(or per discharge instructions) If f/up has not been made  what actions has the care coordinator made to accomplish this? Has transportation been arranged? Yes Patient is not scheduled with PCP at this time, and does not feel it necessary to schedule an appointment at this time as his Cardiologist handles the problems that he is having Scheduled with Cardiology on 01/08/19 and 01/16/19 Care Coordinator reviewed appointment information with patient and advised him that we recommend a 7 day FU with PCP after DC. He declines assistance in scheduling PCP appointment as he doesnt feel it to be necessary at this time. Patient  states that he will transport himself to his appointments Any other questions or concerns expressed by the patient? Patient denies any questions or concerns at this time, Care Coordinator contact information provided for any concerns that may arise. Schedule next appointment with BRENNON Aleman or refer to RN Case Manager/ per the workflow guidelines. When is care coordinators next follow-up call scheduled? If referred for CCM  what RN care manager was the referral assigned? FU within the next 30 days; Discussed CCM with patient as he has had 1 ED visit and 3 admissions within the last 6 months, and he declines a referral at this time. Within 30 days NA  
 WILLIS Call Completed By: Samira Dominguez, [address] [facility] Care Coordinator

## 2018-12-04 LAB
ABO + RH BLD: NORMAL
BLD PROD TYP BPU: NORMAL
BLOOD BANK CMNT PATIENT-IMP: NORMAL
BLOOD GROUP ANTIBODIES SERPL: NORMAL
BPU ID: NORMAL
CROSSMATCH RESULT,%XM: NORMAL
SPECIMEN EXP DATE BLD: NORMAL
STATUS OF UNIT,%ST: NORMAL
UNIT DIVISION, %UDIV: 0

## 2018-12-28 ENCOUNTER — PATIENT OUTREACH (OUTPATIENT)
Dept: CASE MANAGEMENT | Age: 78
End: 2018-12-28

## 2018-12-31 ENCOUNTER — PATIENT OUTREACH (OUTPATIENT)
Dept: CASE MANAGEMENT | Age: 78
End: 2018-12-31

## 2018-12-31 NOTE — PROGRESS NOTES
This note will not be viewable in [address] Spoke with patient for 30 day FU call. He states that he is doing well, has his medications and does not have any questions or concerns. He states that he has his procedure scheduled next week. Episode closed as no further needs are identified at this time.

## 2019-01-07 RX ORDER — DOFETILIDE 0.25 MG/1
250 CAPSULE ORAL 2 TIMES DAILY
COMMUNITY
End: 2019-03-20 | Stop reason: SDUPTHER

## 2019-01-07 NOTE — PROGRESS NOTES
Patient pre-assessment complete for [facility] 45 day dianna scheduled for 19, arrival time 1030. Patient verified using . Patient instructed to bring all home medications in labeled bottles on the day of procedure. NPO status reinforced. Patient instructed to take tikosyn, synthroid,coreg with a small sip of water . Patient verbalizes understanding of all instructions & denies any questions at this time.

## 2019-01-08 ENCOUNTER — HOSPITAL ENCOUNTER (OUTPATIENT)
Dept: CARDIAC CATH/INVASIVE PROCEDURES | Age: 79
Discharge: HOME | End: 2019-01-08
Attending: INTERNAL MEDICINE | Admitting: INTERNAL MEDICINE
Payer: MEDICARE

## 2019-01-08 VITALS
BODY MASS INDEX: 26.6 KG/M2 | HEIGHT: 71 IN | WEIGHT: 190 LBS | DIASTOLIC BLOOD PRESSURE: 76 MMHG | SYSTOLIC BLOOD PRESSURE: 122 MMHG | RESPIRATION RATE: 10 BRPM | OXYGEN SATURATION: 94 % | HEART RATE: 60 BPM

## 2019-01-08 LAB
ANION GAP SERPL CALC-SCNC: 7 MMOL/L (ref 7–16)
ATRIAL RATE: 124 BPM
BUN SERPL-MCNC: 34 MG/DL (ref 8–23)
CALCIUM SERPL-MCNC: 9.7 MG/DL (ref 8.3–10.4)
CALCULATED P AXIS, ECG09: 4 DEGREES
CALCULATED R AXIS, ECG10: 124 DEGREES
CALCULATED T AXIS, ECG11: 139 DEGREES
CHLORIDE SERPL-SCNC: 105 MMOL/L (ref 98–107)
CO2 SERPL-SCNC: 27 MMOL/L (ref 21–32)
CREAT SERPL-MCNC: 1.6 MG/DL (ref 0.8–1.5)
DIAGNOSIS, 93000: NORMAL
ERYTHROCYTE [DISTWIDTH] IN BLOOD BY AUTOMATED COUNT: 15.1 % (ref 11.9–14.6)
GLUCOSE SERPL-MCNC: 117 MG/DL (ref 65–100)
HCT VFR BLD AUTO: 41.7 % (ref 41.1–50.3)
HGB BLD-MCNC: 13.5 G/DL (ref 13.6–17.2)
INR PPP: 1.9
MAGNESIUM SERPL-MCNC: 2.6 MG/DL (ref 1.8–2.4)
MCH RBC QN AUTO: 33 PG (ref 26.1–32.9)
MCHC RBC AUTO-ENTMCNC: 32.4 G/DL (ref 31.4–35)
MCV RBC AUTO: 102 FL (ref 79.6–97.8)
NRBC # BLD: 0 K/UL (ref 0–0.2)
PLATELET # BLD AUTO: 143 K/UL (ref 150–450)
PMV BLD AUTO: 11.1 FL (ref 9.4–12.3)
POTASSIUM SERPL-SCNC: 4.2 MMOL/L (ref 3.5–5.1)
PROTHROMBIN TIME: 21.5 SEC (ref 11.7–14.5)
Q-T INTERVAL, ECG07: 244 MS
QRS DURATION, ECG06: 30 MS
QTC CALCULATION (BEZET), ECG08: 350 MS
RBC # BLD AUTO: 4.09 M/UL (ref 4.23–5.6)
SODIUM SERPL-SCNC: 139 MMOL/L (ref 136–145)
VENTRICULAR RATE, ECG03: 124 BPM
WBC # BLD AUTO: 7.9 K/UL (ref 4.3–11.1)

## 2019-01-08 PROCEDURE — 99152 MOD SED SAME PHYS/QHP 5/>YRS: CPT

## 2019-01-08 PROCEDURE — 85610 PROTHROMBIN TIME: CPT

## 2019-01-08 PROCEDURE — 80048 BASIC METABOLIC PNL TOTAL CA: CPT

## 2019-01-08 PROCEDURE — 93005 ELECTROCARDIOGRAM TRACING: CPT | Performed by: INTERNAL MEDICINE

## 2019-01-08 PROCEDURE — 93312 ECHO TRANSESOPHAGEAL: CPT

## 2019-01-08 PROCEDURE — 74011250636 HC RX REV CODE- 250/636

## 2019-01-08 PROCEDURE — 83735 ASSAY OF MAGNESIUM: CPT

## 2019-01-08 PROCEDURE — 85027 COMPLETE CBC AUTOMATED: CPT

## 2019-01-08 RX ORDER — CLOPIDOGREL BISULFATE 75 MG/1
75 TABLET ORAL DAILY
Qty: 30 TAB | Refills: 6 | Status: SHIPPED | OUTPATIENT
Start: 2019-01-08 | End: 2019-07-18

## 2019-01-08 RX ORDER — FENTANYL CITRATE 50 UG/ML
25-50 INJECTION, SOLUTION INTRAMUSCULAR; INTRAVENOUS
Status: DISCONTINUED | OUTPATIENT
Start: 2019-01-08 | End: 2019-01-08 | Stop reason: HOSPADM

## 2019-01-08 RX ORDER — MIDAZOLAM HYDROCHLORIDE 1 MG/ML
.5-2 INJECTION, SOLUTION INTRAMUSCULAR; INTRAVENOUS
Status: DISCONTINUED | OUTPATIENT
Start: 2019-01-08 | End: 2019-01-08 | Stop reason: HOSPADM

## 2019-01-08 RX ORDER — SODIUM CHLORIDE 9 MG/ML
75 INJECTION, SOLUTION INTRAVENOUS CONTINUOUS
Status: DISCONTINUED | OUTPATIENT
Start: 2019-01-08 | End: 2019-01-08 | Stop reason: HOSPADM

## 2019-01-08 RX ADMIN — FENTANYL CITRATE 50 MCG: 50 INJECTION, SOLUTION INTRAMUSCULAR; INTRAVENOUS at 12:27

## 2019-01-08 RX ADMIN — MIDAZOLAM HYDROCHLORIDE 2 MG: 1 INJECTION, SOLUTION INTRAMUSCULAR; INTRAVENOUS at 12:27

## 2019-01-08 RX ADMIN — FENTANYL CITRATE 25 MCG: 50 INJECTION, SOLUTION INTRAMUSCULAR; INTRAVENOUS at 12:35

## 2019-01-08 RX ADMIN — MIDAZOLAM HYDROCHLORIDE 2 MG: 1 INJECTION, SOLUTION INTRAMUSCULAR; INTRAVENOUS at 12:35

## 2019-01-08 NOTE — PROGRESS NOTES
39 Day watchman AMBER Patient can stop xarelto. He will start plavix 75mg today and continue ASA 81mg. He will be contacted when he can stop plavix 75mg. Patient has office follow up with Sai Kevin on 1/22/19

## 2019-01-08 NOTE — PROGRESS NOTES
Jann Omalley Medications and vital signs as noted. Report given to Nimisha Andrews RN regarding procedure, medications, and vital signs. Pt's situation, background, assessment, and recommendations reviewed with receiving RN. Opportunity provided for clarification of concerns. Pt denies complaints.

## 2019-01-08 NOTE — DISCHARGE INSTRUCTIONS
Aspirin 81 mg daily. Plavix 75 mg daily. STOP TAKING Xarelto. After your Transesophageal echocardiogram (AMBER)    Do not eat or drink for at least two hours after your procedure. Your throat will be numb and there is a risk you might have difficulty swallowing for a while. Be careful when you do eat or drink for the first time especially with hot fluids since you could easily burn your throat. Call your doctor if:    You are bleeding from your throat or mouth  You have trouble breathing all of a sudden  You have chest pain or any pain that spreads to your neck, jaw or arms. You have questions or concerns  You have a fever greater than 101 F    Do not drive for 24 hours. Do not drink hot fluids for the next 3 hours.

## 2019-01-08 NOTE — PROGRESS NOTES
Report received from Beka . Procedural findings communicated. Intra procedural  medication administration reviewed. Progression of care discussed. Patient received into [id number] [facility] 1 post procedure. Routine post procedural vital signs  yes

## 2019-01-08 NOTE — PROGRESS NOTES
Patient received to [address] [place] [address] room # 1  Ambulatory from [facility]. Patient scheduled for AMBER today with Dr Manuel Sam. Procedure reviewed & questions answered, voiced good understanding consent obtained & placed on chart. All medications and medical history reviewed. Will prep patient per orders. Patient & family updated on plan of care. The patient has a fraility score of 3-MANAGING WELL, based on patient A&Ox3, patient able to ambulate to room without difficulty.

## 2019-01-08 NOTE — H&P
[facility] Cardiology History & Physical  
  
Date of  Admission: 1/8/2019 10:18 AM  
 
CC: Post watchman AMBER 
 
HPI:  Momo Crouch is a 69 y.o. male 69year old patient with a long-term ischemic dilated cardiomyopathy that had been followed by [facility] for many years' and now followed by [facility]. [facility] has a history of coronary artery disease with four-vessel CABG in October 2004. [facility] also has a history of chronic ischemic dilated cardiomyopathy with ejection fraction of 20-25% for many years' time.  He did have a history of a Medtronic dual-chamber ICD that was implanted in the past and eventually appeared to be upgraded to a [address] did have a recalled lead and underwent a laser lead extraction by Dr. Francis Carrillo in October 2011.   
  
Pt had episode of worsening palpitations and afib over the past few weeks, however, currently feels well. He denies chest pain, SOB, presyncope, or syncope. Pt was taken off anticoagulation for recurrent hematuria.  
Doing well post watchman; occasional hematuria/epistaxis.  
  
Cardiac PMH: (Old records have been reviewed and summarized below) 1.  Coronary artery disease status post four-vessel CABG in October 2004. 2.  History of myocardial infarction as early as 1995. 3.  Biventricular ICD since October 2004. 4.  Laser lead extraction of recalled Medtronic ICD lead in September 2011 by Dr. Francis Carrillo. 5.  Atrial lead damage at the time of laser lead extraction.   
6.  Currently has a Medtronic biventricular ICD implanted in September 2011. 7.  Moderate to severe mitral regurgitation. 8.  Paroxysmal atrial fibrillation. 9.  Experimental stem cell transplant in [facility] in 2007.   
10. Complete thrombosis of left subclavian vein. 11. Chronic systolic heart failure, EF 25%. 12. Ventricular tachycardia. 13. ECHO - Jan 2014 - EF 15%, mod MR 
14. CV - June 2014 - Able to maintain NSR for about 1 week 15. CV - June 2014 - Able to maintain NSR with amio 16. ECHO - Aug 2014 - EF<15% 17. A. Fib ablation - Oct 2014 - done at [facility] 18. Gen Change - MDT - Aug 2015 19. CV - Tikosyn loading - May 2016 
20. Acute Amio reaction - May 2016 
21. ECHO - Aug 2016 - EF 15-20%, Mod MR Past Medical History:  
Diagnosis Date  Acute diastolic CHF (congestive heart failure) ([address] 9/15/2015  Arrhythmia   
 afib, icd  Atypical atrial flutter ([address] 4/14/2016  Biventricular ICD (implantable cardioverter-defibrillator) in place 2004  CAD (coronary artery disease) stents heart 2002, cabg 2002  Chest pain 9/15/2015  Chronic systolic CHF (congestive heart failure) ([address] 4/28/2016  Coronary atherosclerosis of native coronary vessel 9/15/2015  Dyspnea 9/15/2015  Essential hypertension, benign 9/15/2015  Heart failure ([address]  Hx of CABG 2004  Hyperlipidemia 9/15/2015  Hypertension  Hypothyroidism 6/3/2014  Ischemic cardiomyopathy 6/3/2014  Long-term use of high-risk medication 5/23/2016  Myocardial infarction acute ([address] 9/15/2015  Other and unspecified angina pectoris 9/15/2015  Palpitations 9/15/2015  Paroxysmal ventricular tachycardia ([address] 9/15/2015  Persistent atrial fibrillation ([address] 5/6/2016 1. Attempt A. Fib ablation at [facility] - 2015 2. 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  
Procedure Laterality Date  CARDIAC SURG PROCEDURE UNLIST    
 cabg nov 2004  HX HEENT    
 tonsillectomy  HX PACEMAKER    
 nov 2004 Allergies Allergen Reactions  Pradaxa [Dabigatran Etexilate] Not Reported This Time and Anaphylaxis  Shellfish Derived Nausea and Vomiting Social History Socioeconomic History  Marital status: SINGLE  
 Spouse name: Not on file  Number of children: Not on file  Years of education: Not on file  Highest education level: Not on file Social Needs  Financial resource strain: Not on file  Food insecurity - worry: Not on file  Food insecurity - inability: Not on file  Transportation needs - medical: Not on file  Transportation needs - non-medical: Not on file Occupational History  Not on file Tobacco Use  Smoking status: Former Smoker Last attempt to quit: 2003 Years since quittin.0  Smokeless tobacco: Never Used  Tobacco comment: pipe quit 10 years ago Substance and Sexual Activity  Alcohol use: No  
 Drug use: No  
 Sexual activity: Not on file Other Topics Concern  Not on file Social History Narrative  Not on file Family History Problem Relation Age of Onset  Heart Attack Other  Heart Failure Other Congestive Current Facility-Administered Medications Medication Dose Route Frequency  0.9% sodium chloride infusion  75 mL/hr IntraVENous CONTINUOUS  
 midazolam (VERSED) injection 0.5-2 mg  0.5-2 mg IntraVENous Multiple  fentaNYL citrate (PF) injection 25-50 mcg  25-50 mcg IntraVENous Multiple Review of Systems ROS As per HPI; all others reviewed and negative Subjective:  
 
Visit Vitals /77 Pulse (!) 59 Resp 14 Ht 5' 11\" (1.803 m) Wt 86.2 kg (190 lb) SpO2 90% BMI 26.50 kg/m² No intake/output data recorded. No intake/output data recorded. Physical Exam: 
 
General appearance: Alert, well appearing, and in no distress Eyes: Sclerae anicteric ENT: Hearing grossly normal bilaterally CV: RR, paced, distant S1, S2, no M/R/G, no JVD, normal distal pulses, no lower extremity edema Pulm: Clear to auscultation, no wheezes, no crackles, no accessory muscle use GI: Soft, nontender, nondistended Labs:  
Recent Results (from the past 24 hour(s)) CBC W/O DIFF  
 Collection Time: 01/08/19 10:48 AM  
Result Value Ref Range WBC 7.9 4.3 - 11.1 K/uL  
 RBC 4.09 (L) 4.23 - 5.6 M/uL  
 HGB 13.5 (L) 13.6 - 17.2 g/dL HCT 41.7 41.1 - 50.3 % .0 (H) 79.6 - 97.8 FL  
 MCH 33.0 (H) 26.1 - 32.9 PG  
 MCHC 32.4 31.4 - 35.0 g/dL  
 RDW 15.1 (H) 11.9 - 14.6 % PLATELET 495 (L) 897 - 450 K/uL MPV 11.1 9.4 - 12.3 FL ABSOLUTE NRBC 0.00 0.0 - 0.2 K/uL MAGNESIUM Collection Time: 01/08/19 10:48 AM  
Result Value Ref Range Magnesium 2.6 (H) 1.8 - 2.4 mg/dL METABOLIC PANEL, BASIC Collection Time: 01/08/19 10:48 AM  
Result Value Ref Range Sodium 139 136 - 145 mmol/L Potassium 4.2 3.5 - 5.1 mmol/L Chloride 105 98 - 107 mmol/L  
 CO2 27 21 - 32 mmol/L Anion gap 7 7 - 16 mmol/L Glucose 117 (H) 65 - 100 mg/dL BUN 34 (H) 8 - 23 MG/DL Creatinine 1.60 (H) 0.8 - 1.5 MG/DL  
 GFR est AA 54 (L) >60 ml/min/1.73m2 GFR est non-AA 45 (L) >60 ml/min/1.73m2 Calcium 9.7 8.3 - 10.4 MG/DL PROTHROMBIN TIME + INR Collection Time: 01/08/19 10:48 AM  
Result Value Ref Range Prothrombin time 21.5 (H) 11.7 - 14.5 sec INR 1.9 EKG, 12 LEAD, INITIAL Collection Time: 01/08/19 10:52 AM  
Result Value Ref Range Ventricular Rate 124 BPM  
 Atrial Rate 124 BPM  
 QRS Duration 30 ms  
 Q-T Interval 244 ms QTC Calculation (Bezet) 350 ms Calculated P Axis 4 degrees Calculated R Axis 124 degrees Calculated T Axis 139 degrees Diagnosis Ventricular-paced rhythm in a pattern of bigeminy Abnormal ECG When compared with ECG of 28-NOV-2018 10:32, 
Vent. rate has increased BY  14 BPM 
Confirmed by MAN ALVARADO (), Sue Mota ([id number]) on 1/8/2019 10:55:38 AM 
  
 
 
 Assessment/Plan: 1.  ICD implantation.  Stable BiV ICD device, chronic high atrial threshold 
  
2.  Atrial fibrillation.  Able to maintain NSR Post A. Fib ablation done at [facility] CV and Tikosyn loading.  S/p watchman device 
  
3.  Ventricular tachycardia.  
  
 4.  C/S CHF, NYHA class III - Stable EF 15-20%. On Coreg and lisinopril 
  
 
Plan for AMBER; if no significant peridevice flow noted, will stop anticoagulation and plan aspirin/plavix for 6 months followed by aspirin indefinitely.   
 
 
Clay Stone MD 
1/8/2019 11:06 AM

## 2019-05-30 ENCOUNTER — TELEPHONE (OUTPATIENT)
Dept: CARDIAC CATH/INVASIVE PROCEDURES | Age: 79
End: 2019-05-30

## 2019-05-30 NOTE — TELEPHONE ENCOUNTER
Patient contacted for [facility] 6 month follow up Phone Call. Patient currently taking [id number] and plavix 75mg. Patient can now stop plavix 75 mg per Dr. Jordana Drake and the [facility] protocol. He will continue ASA 81mg indefinitely. He states that over all he feels well. He has not had any hospitalizations or procedures since his 45 day follow up. Patient has no questions or concerns at this time.

## 2019-08-02 ENCOUNTER — APPOINTMENT (OUTPATIENT)
Dept: CT IMAGING | Age: 79
End: 2019-08-02
Attending: EMERGENCY MEDICINE
Payer: MEDICARE

## 2019-08-02 ENCOUNTER — HOSPITAL ENCOUNTER (EMERGENCY)
Age: 79
Discharge: HOME | End: 2019-08-02
Attending: EMERGENCY MEDICINE
Payer: MEDICARE

## 2019-08-02 ENCOUNTER — APPOINTMENT (OUTPATIENT)
Dept: GENERAL RADIOLOGY | Age: 79
End: 2019-08-02
Attending: EMERGENCY MEDICINE
Payer: MEDICARE

## 2019-08-02 VITALS
TEMPERATURE: 97.3 F | HEIGHT: 71 IN | HEART RATE: 55 BPM | RESPIRATION RATE: 20 BRPM | DIASTOLIC BLOOD PRESSURE: 67 MMHG | WEIGHT: 188.2 LBS | OXYGEN SATURATION: 97 % | SYSTOLIC BLOOD PRESSURE: 119 MMHG | BODY MASS INDEX: 26.35 KG/M2

## 2019-08-02 DIAGNOSIS — J90 PLEURAL EFFUSION: ICD-10-CM

## 2019-08-02 DIAGNOSIS — R06.09 DYSPNEA ON EXERTION: ICD-10-CM

## 2019-08-02 DIAGNOSIS — R06.00 DYSPNEA, UNSPECIFIED TYPE: Primary | ICD-10-CM

## 2019-08-02 PROBLEM — Z87.891 HISTORY OF TOBACCO ABUSE: Chronic | Status: ACTIVE | Noted: 2019-08-02

## 2019-08-02 PROBLEM — R09.02 HYPOXIA: Status: ACTIVE | Noted: 2019-08-02

## 2019-08-02 LAB
ALBUMIN SERPL-MCNC: 3.5 G/DL (ref 3.2–4.6)
ALBUMIN/GLOB SERPL: 0.9 {RATIO} (ref 1.2–3.5)
ALP SERPL-CCNC: 109 U/L (ref 50–136)
ALT SERPL-CCNC: 25 U/L (ref 12–65)
ANION GAP SERPL CALC-SCNC: 11 MMOL/L (ref 7–16)
APPEARANCE FLD: NORMAL
AST SERPL-CCNC: 27 U/L (ref 15–37)
ATRIAL RATE: 59 BPM
BASOPHILS # BLD: 0.1 K/UL (ref 0–0.2)
BASOPHILS NFR BLD: 1 % (ref 0–2)
BILIRUB SERPL-MCNC: 1.2 MG/DL (ref 0.2–1.1)
BNP SERPL-MCNC: 1323 PG/ML
BUN SERPL-MCNC: 32 MG/DL (ref 8–23)
CALCIUM SERPL-MCNC: 9.1 MG/DL (ref 8.3–10.4)
CALCULATED P AXIS, ECG09: -23 DEGREES
CALCULATED R AXIS, ECG10: 145 DEGREES
CALCULATED T AXIS, ECG11: -108 DEGREES
CHLORIDE SERPL-SCNC: 101 MMOL/L (ref 98–107)
CO2 SERPL-SCNC: 24 MMOL/L (ref 21–32)
COLOR FLD: NORMAL
CREAT SERPL-MCNC: 1.52 MG/DL (ref 0.8–1.5)
DIAGNOSIS, 93000: NORMAL
DIFFERENTIAL METHOD BLD: ABNORMAL
EOSINOPHIL # BLD: 0.1 K/UL (ref 0–0.8)
EOSINOPHIL NFR BLD: 1 % (ref 0.5–7.8)
ERYTHROCYTE [DISTWIDTH] IN BLOOD BY AUTOMATED COUNT: 17.4 % (ref 11.9–14.6)
GLOBULIN SER CALC-MCNC: 3.8 G/DL (ref 2.3–3.5)
GLUCOSE SERPL-MCNC: 132 MG/DL (ref 65–100)
HCT VFR BLD AUTO: 44.8 % (ref 41.1–50.3)
HGB BLD-MCNC: 14.8 G/DL (ref 13.6–17.2)
IMM GRANULOCYTES # BLD AUTO: 0 K/UL (ref 0–0.5)
IMM GRANULOCYTES NFR BLD AUTO: 0 % (ref 0–5)
LYMPHOCYTES # BLD: 2.3 K/UL (ref 0.5–4.6)
LYMPHOCYTES NFR BLD: 27 % (ref 13–44)
LYMPHOCYTES NFR BRONCH MANUAL: 90 %
MACROPHAGES NFR BRONCH MANUAL: 4 %
MCH RBC QN AUTO: 31.9 PG (ref 26.1–32.9)
MCHC RBC AUTO-ENTMCNC: 33 G/DL (ref 31.4–35)
MCV RBC AUTO: 96.6 FL (ref 79.6–97.8)
MONOCYTES # BLD: 0.7 K/UL (ref 0.1–1.3)
MONOCYTES NFR BLD: 9 % (ref 4–12)
NEUTROPHILS NFR BRONCH MANUAL: 6 %
NEUTS SEG # BLD: 5.1 K/UL (ref 1.7–8.2)
NEUTS SEG NFR BLD: 62 % (ref 43–78)
NRBC # BLD: 0 K/UL (ref 0–0.2)
NUC CELL # FLD: 636 /CU MM
PLATELET # BLD AUTO: 132 K/UL (ref 150–450)
PMV BLD AUTO: 11 FL (ref 9.4–12.3)
POTASSIUM SERPL-SCNC: 3.5 MMOL/L (ref 3.5–5.1)
PROT SERPL-MCNC: 7.3 G/DL (ref 6.3–8.2)
Q-T INTERVAL, ECG07: 244 MS
QRS DURATION, ECG06: 40 MS
QTC CALCULATION (BEZET), ECG08: 331 MS
RBC # BLD AUTO: 4.64 M/UL (ref 4.23–5.6)
RBC # FLD: >1000 /CU MM
SODIUM SERPL-SCNC: 136 MMOL/L (ref 136–145)
SPECIMEN SOURCE FLD: NORMAL
TROPONIN I SERPL-MCNC: 0.02 NG/ML (ref 0.02–0.05)
VENTRICULAR RATE, ECG03: 111 BPM
WBC # BLD AUTO: 8.3 K/UL (ref 4.3–11.1)

## 2019-08-02 PROCEDURE — 84484 ASSAY OF TROPONIN QUANT: CPT

## 2019-08-02 PROCEDURE — 99285 EMERGENCY DEPT VISIT HI MDM: CPT | Performed by: EMERGENCY MEDICINE

## 2019-08-02 PROCEDURE — 80053 COMPREHEN METABOLIC PANEL: CPT

## 2019-08-02 PROCEDURE — 76604 US EXAM CHEST: CPT | Performed by: INTERNAL MEDICINE

## 2019-08-02 PROCEDURE — 71046 X-RAY EXAM CHEST 2 VIEWS: CPT

## 2019-08-02 PROCEDURE — 99284 EMERGENCY DEPT VISIT MOD MDM: CPT | Performed by: INTERNAL MEDICINE

## 2019-08-02 PROCEDURE — 84157 ASSAY OF PROTEIN OTHER: CPT

## 2019-08-02 PROCEDURE — 83615 LACTATE (LD) (LDH) ENZYME: CPT

## 2019-08-02 PROCEDURE — 77030014147 HC TY THORCENT PARA TELE -B: Performed by: EMERGENCY MEDICINE

## 2019-08-02 PROCEDURE — 87116 MYCOBACTERIA CULTURE: CPT

## 2019-08-02 PROCEDURE — 87205 SMEAR GRAM STAIN: CPT

## 2019-08-02 PROCEDURE — 88112 CYTOPATH CELL ENHANCE TECH: CPT

## 2019-08-02 PROCEDURE — 93005 ELECTROCARDIOGRAM TRACING: CPT | Performed by: EMERGENCY MEDICINE

## 2019-08-02 PROCEDURE — 82945 GLUCOSE OTHER FLUID: CPT

## 2019-08-02 PROCEDURE — 85025 COMPLETE CBC W/AUTO DIFF WBC: CPT

## 2019-08-02 PROCEDURE — 83880 ASSAY OF NATRIURETIC PEPTIDE: CPT

## 2019-08-02 PROCEDURE — 71250 CT THORAX DX C-: CPT

## 2019-08-02 PROCEDURE — 36415 COLL VENOUS BLD VENIPUNCTURE: CPT

## 2019-08-02 PROCEDURE — 76040000019: Performed by: INTERNAL MEDICINE

## 2019-08-02 PROCEDURE — 88305 TISSUE EXAM BY PATHOLOGIST: CPT

## 2019-08-02 PROCEDURE — 32555 ASPIRATE PLEURA W/ IMAGING: CPT | Performed by: INTERNAL MEDICINE

## 2019-08-02 PROCEDURE — 75810000165 HC THORACENTESIS: Performed by: EMERGENCY MEDICINE

## 2019-08-02 PROCEDURE — 89050 BODY FLUID CELL COUNT: CPT

## 2019-08-02 PROCEDURE — 87102 FUNGUS ISOLATION CULTURE: CPT

## 2019-08-02 RX ORDER — FLUTICASONE FUROATE AND VILANTEROL 100; 25 UG/1; UG/1
1 POWDER RESPIRATORY (INHALATION) DAILY
Qty: 1 INHALER | Refills: 1 | Status: SHIPPED | OUTPATIENT
Start: 2019-08-02

## 2019-08-02 RX ORDER — PREDNISONE 20 MG/1
20 TABLET ORAL DAILY
Qty: 5 TAB | Refills: 0 | Status: SHIPPED | OUTPATIENT
Start: 2019-08-02 | End: 2019-08-07

## 2019-08-02 NOTE — ED TRIAGE NOTES
Pt to triage via w/c. Pt states recently tx for pna and completed ABT about 5 days ago. Pt states also has CHF and has pitting edema to BLE. Pt states he has taken about 400mg of lasix in the last 12 hours taking 80mg doses at a time. Pt educated that it is too high of a dose and 400mg shouldn't be reached when taking lasix at home. Pt denies fever, n/v/d, or cp.

## 2019-08-02 NOTE — ED NOTES
Patient reports dry cough, unable to cough up sputum. Dr. Cobos Poisson aware. Patient and wife requesting to be discharged. Patient to be discharged with instructions to return to ER with any worsening of symptoms.

## 2019-08-02 NOTE — PROGRESS NOTES
Pt sat up on side of bed for thoracentesis. Consent obtained. Time out performed. Pts vitals monitored throughout procedure. Left ultrasound done and pic taken of pleural fluid.  ~500 ml troy colored pleural fluid from L. Pt tolerated procedure well with no adverse rxn. Specimens sent to the lab x 3 and labeled appropriately. Site dressed appropriately and report given to pts RN.   L Lung sliding done and ultrasound findings reviewed by MD.

## 2019-08-02 NOTE — ED NOTES
I have reviewed discharge instructions with the patient and spouse. The patient and spouse verbalized understanding. Patient left ED via Discharge Method: wheelchair to Home with transport from wife. The patient has been wheeled to car via nurse and appears in no acute distress. The patient has been provided discharge instructions, prescriptions x2, and follow up information. The patient and wife do not have any questions at this time. Opportunity for questions and clarification provided. Patient given 2 scripts. To continue your aftercare when you leave the hospital, you may receive an automated call from our care team to check in on how you are doing. This is a free service and part of our promise to provide the best care and service to meet your aftercare needs.  If you have questions, or wish to unsubscribe from this service please call [phone]. Thank you for Choosing our [facility] [employer] [facility] Emergency Department.

## 2019-08-02 NOTE — ED PROVIDER NOTES
Here with shortness of breath. Towards the end of July he had an inpatient stay for pneumonia. He has now completed antibiotics. 's of breath actually has been present predating this in over 1 month. Times at home he gets short of breath and have his saturations pulse oximetry taken at home dropped to 82-84. Last night. Denies any recent significant infected sputum. Felt as though he might have extra fluid so last 24 hours as taken Lasix. Recent sent. Past has had a watchman procedure    The history is provided by the patient and the spouse. Shortness of Breath   This is a recurrent problem. The current episode started more than 1 week ago. Associated symptoms include sputum production. Pertinent negatives include no fever, no cough, no wheezing, no orthopnea, no chest pain, no leg pain and no leg swelling. Associated medical issues include pneumonia. Associated medical issues do not include PE or CAD. Past Medical History:   Diagnosis Date    Acute diastolic CHF (congestive heart failure) ([address] 9/15/2015    Arrhythmia     afib, icd    Atypical atrial flutter ([address] 4/14/2016    Biventricular ICD (implantable cardioverter-defibrillator) in place 2004    CAD (coronary artery disease)     stents heart 2002, cabg 2002    Chest pain 9/15/2015    Chronic systolic CHF (congestive heart failure) ([address] 4/28/2016    Coronary atherosclerosis of native coronary vessel 9/15/2015    Dyspnea 9/15/2015    Essential hypertension, benign 9/15/2015    Heart failure ([address]     Hx of CABG 2004    Hyperlipidemia 9/15/2015    Hypertension     Hypothyroidism 6/3/2014    Ischemic cardiomyopathy 6/3/2014    Long-term use of high-risk medication 5/23/2016    Myocardial infarction acute ([address] 9/15/2015    Other and unspecified angina pectoris 9/15/2015    Palpitations 9/15/2015    Paroxysmal ventricular tachycardia ([address] 9/15/2015    Persistent atrial fibrillation ([address] 5/6/2016    1. Attempt A.  Fib ablation at [address] -  2.  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    Unstable angina ([address] 9/15/2015       Past Surgical History:   Procedure Laterality Date    CARDIAC SURG PROCEDURE UNLIST      cabg 2004    HX HEENT      tonsillectomy    HX PACEMAKER      2004         Family History:   Problem Relation Age of Onset    Heart Attack Other     Heart Failure Other         Congestive       Social History     Socioeconomic History    Marital status:      Spouse name: Not on file    Number of children: Not on file    Years of education: Not on file    Highest education level: Not on file   Occupational History    Not on file   Social Needs    Financial resource strain: Not on file    Food insecurity:     Worry: Not on file     Inability: Not on file    Transportation needs:     Medical: Not on file     Non-medical: Not on file   Tobacco Use    Smoking status: Former Smoker     Last attempt to quit: 2003     Years since quittin.5    Smokeless tobacco: Never Used    Tobacco comment: pipe quit 10 years ago   Substance and Sexual Activity    Alcohol use: No    Drug use: No    Sexual activity: Not on file   Lifestyle    Physical activity:     Days per week: Not on file     Minutes per session: Not on file    Stress: Not on file   Relationships    Social connections:     Talks on phone: Not on file     Gets together: Not on file     Attends Cheondoism service: Not on file     Active member of club or organization: Not on file     Attends meetings of clubs or organizations: Not on file     Relationship status: Not on file    Intimate partner violence:     Fear of current or ex partner: Not on file     Emotionally abused: Not on file     Physically abused: Not on file     Forced sexual activity: Not on file   Other Topics Concern    Not on file Social History Narrative    Not on file         ALLERGIES: Pradaxa [dabigatran etexilate] and Shellfish derived    Review of Systems   Constitutional: Negative for chills, diaphoresis and fever. Respiratory: Positive for sputum production and shortness of breath. Negative for cough and wheezing. Cardiovascular: Negative for chest pain, orthopnea and leg swelling. Gastrointestinal: Negative. Genitourinary: Negative. Musculoskeletal: Negative. Psychiatric/Behavioral: Negative for behavioral problems and confusion. All other systems reviewed and are negative. Vitals:    08/02/19 1218 08/02/19 1228 08/02/19 1416 08/02/19 1434   BP: 125/72  124/71    Pulse: 61  61    Resp:       Temp:       SpO2: 97% 97%     Weight:    85.4 kg (188 lb 3.2 oz)   Height:                Physical Exam   Constitutional: He appears well-developed and well-nourished. No distress. Pleasant cooperative at no point with significant extremitas   HENT:   Head: Atraumatic. Eyes: No scleral icterus. Neck: Neck supple. Cardiovascular: Normal rate. Murmur heard. Systolic murmur is present with a grade of 2/6. Paced rhythm   Pulmonary/Chest: Effort normal. No accessory muscle usage. No respiratory distress. He has decreased breath sounds in the left lower field. He has no wheezes. Abdominal: Soft. Musculoskeletal: Normal range of motion. Neurological: He is alert. Skin: Skin is warm and dry. Psychiatric: He has a normal mood and affect. His behavior is normal. Thought content normal. His mood appears not anxious. Nursing note and vitals reviewed. MDM  Number of Diagnoses or Management Options  Dyspnea, unspecified type:   Pleural effusion:   Diagnosis management comments: Going sense of shortness of breath refractory to has a recent hospitalization for pneumonia. He has pending referral to pulmonary. He has completed antibiotics from recent pneumonia hospitalization.   Serg is contacted our department he is seen and thoracentesis.   Patient subjectively and objectively feels much better after       Amount and/or Complexity of Data Reviewed  Clinical lab tests: reviewed and ordered  Tests in the radiology section of CPT®: reviewed and ordered  Decide to obtain previous medical records or to obtain history from someone other than the patient: yes  Obtain history from someone other than the patient: yes  Discuss the patient with other providers: yes  Independent visualization of images, tracings, or specimens: yes    Risk of Complications, Morbidity, and/or Mortality  Presenting problems: high  Diagnostic procedures: low  Management options: moderate    Patient Progress  Patient progress: improved         Procedures

## 2019-08-02 NOTE — CONSULTS
CONSULT NOTE    Kanchan Ramsay    8/2/2019    Date of Admission:  8/2/2019    The patient's chart is reviewed and the patient is discussed with the staff. Subjective:     Patient is a 67 y.o.  male seen and evaluated at the request of Dr. Dolly Campbell for pleural effusion. Patient presented with significant shorntess of breath, orthopnea and productive cough with yellow sputum. About 7-10 days ago was admitted to [facility] and treated for presumed pneumonia. Was discharged home on 2 antibiotics and has failed to improve. Is also having episodes of hypoxia noted just sitting in the chair with sat dropping to 82% and he is \"gasping for air\". He has no history of lung disease and is not on home O2. He sat up all last night and took additional Lasix because \"I couldn't breath\" (about 400mg in the last 12 hours). Reports has had chills and has had temps around 95.0. Chronic medical: AFib/flutter, s/p Watchman, CKD  Systolic heart failure with EF 20-25% in January, BiV ICD, CAD with hx CABG, hx tobacco abuse( smoked pipe).       Review of Systems  A comprehensive review of systems was negative except for: Constitutional: positive for chills, fatigue, malaise and anorexia  Respiratory: positive for cough, sputum, dyspnea on exertion or hypoxia  Cardiovascular: positive for dyspnea, irregular heart beats, fatigue, lower extremity edema, pacemaker, Watchman, dyspnea on exertion  Musculoskeletal: positive for muscle weakness  Behvioral/Psych: positive for anxiety  Endocrine: positive for hypothyroidism    Patient Active Problem List   Diagnosis Code    Ischemic cardiomyopathy I25.5    CAD (coronary artery disease) I25.10    Biventricular ICD (implantable cardioverter-defibrillator) in place Z95.810    Hypothyroidism E03.9    Hyperlipidemia E78.5    Palpitations R00.2    Essential hypertension, benign I10    Renal insufficiency N28.9    Paroxysmal ventricular tachycardia (HCC) I47.2    Coronary atherosclerosis of native coronary vessel I25.10    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    Unstable angina (HCC) I20.0    Dyspnea R06.00    PVC (premature ventricular contraction) I49.3    Acute diastolic CHF (congestive heart failure) (HCC) I50.31    Other and unspecified angina pectoris I20.9    Myocardial infarction acute (HCC) I21.9    Chest pain R07.9    Atypical atrial flutter (HCC) I48.4    Chronic systolic CHF (congestive heart failure) (HCC) I50.22    Persistent atrial fibrillation (HCC) I48.1    Long-term use of high-risk medication Z79.899    Congestive heart failure (HCC) I50.9    Chronic kidney disease N18.9    Coronary arteriosclerosis in native artery I25.10    History of high risk medication treatment Z92.29    Hypertension I10    History of coronary artery bypass surgery Z95.1    Cardiac defibrillator in place Z95.810    Myocardial infarction ([address] I21.9    Chronic ischemic heart disease I25.9    History of cardiovascular disorder Z86.79    Subclavian vein thrombosis (HCC) I82. B19    Ventricular tachycardia (HCC) I47.2    Arrhythmia I49.9    Heart failure (HCC) I50.9    Hx of CABG Z95.1    A-fib ([facility]) I48.91    Pleural effusion J90    Hypoxia R09.02    History of tobacco abuse Z87.891       Home DME company none. Prior to Admission Medications   Prescriptions Last Dose Informant Patient Reported? Taking? ALPRAZolam (XANAX) 0.5 mg tablet   Yes No   Sig: Take 0.5 mg by mouth nightly as needed. OTHER   Yes No   Sig: daily. Tumeric daily   aspirin 81 mg chewable tablet   Yes No   Sig: Take 81 mg by mouth daily. carvedilol (COREG) 12.5 mg tablet   No No   Sig: Take 1 Tab by mouth two (2) times daily (with meals). dofetilide (TIKOSYN) 250 mcg capsule   No No   Sig: Take 1 Cap by mouth two (2) times a day. dutasteride (AVODART) 0.5 mg capsule   Yes No   Sig: Take 0.5 mg by mouth daily.    finasteride (PROSCAR) 5 mg tablet   Yes No   Sig: Take 5 mg by mouth daily. furosemide (LASIX) 40 mg tablet   No No   Sig: Take 1 Tab by mouth as needed (for weight gain 3 lbs overnight/ 5 lbs in week). If light headedness occurs, take 0.5 tablet daily. levothyroxine (SYNTHROID) 125 mcg tablet   Yes No   Sig: Take 125 mcg by mouth Daily (before breakfast). multivitamin (ONE A DAY) tablet   Yes No   Sig: Take 1 Tab by mouth daily. omega-3 fatty acids-vitamin e 2,000-650-12 mg/2.5 gram elpk   Yes No   Sig: Take 2,000 mg by mouth daily. spironolactone (ALDACTONE) 25 mg tablet   No No   Sig: Take 1 Tab by mouth daily. Facility-Administered Medications: None       Past Medical History:   Diagnosis Date    Acute diastolic CHF (congestive heart failure) ([address] 9/15/2015    Arrhythmia     afib, icd    Atypical atrial flutter ([address] 4/14/2016    Biventricular ICD (implantable cardioverter-defibrillator) in place 2004    CAD (coronary artery disease)     stents heart 2002, cabg 2002    Chest pain 9/15/2015    Chronic systolic CHF (congestive heart failure) ([address] 4/28/2016    Coronary atherosclerosis of native coronary vessel 9/15/2015    Dyspnea 9/15/2015    Essential hypertension, benign 9/15/2015    Heart failure ([address]     Hx of CABG 2004    Hyperlipidemia 9/15/2015    Hypertension     Hypothyroidism 6/3/2014    Ischemic cardiomyopathy 6/3/2014    Long-term use of high-risk medication 5/23/2016    Myocardial infarction acute ([address] 9/15/2015    Other and unspecified angina pectoris 9/15/2015    Palpitations 9/15/2015    Paroxysmal ventricular tachycardia ([address] 9/15/2015    Persistent atrial fibrillation ([address] 5/6/2016    1. Attempt A. Fib ablation at [address] - 2015 2.  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    Unstable angina ([address] 9/15/2015     Past Surgical History:   Procedure Laterality Date    CARDIAC SURG PROCEDURE UNLIST      cabg 2004    HX HEENT      tonsillectomy    HX PACEMAKER      2004     Social History     Socioeconomic History    Marital status:      Spouse name: Not on file    Number of children: Not on file    Years of education: Not on file    Highest education level: Not on file   Occupational History    Not on file   Social Needs    Financial resource strain: Not on file    Food insecurity:     Worry: Not on file     Inability: Not on file    Transportation needs:     Medical: Not on file     Non-medical: Not on file   Tobacco Use    Smoking status: Former Smoker     Last attempt to quit: 2003     Years since quittin.5    Smokeless tobacco: Never Used    Tobacco comment: pipe quit 10 years ago   Substance and Sexual Activity    Alcohol use: No    Drug use: No    Sexual activity: Not on file   Lifestyle    Physical activity:     Days per week: Not on file     Minutes per session: Not on file    Stress: Not on file   Relationships    Social connections:     Talks on phone: Not on file     Gets together: Not on file     Attends Hindu service: Not on file     Active member of club or organization: Not on file     Attends meetings of clubs or organizations: Not on file     Relationship status: Not on file    Intimate partner violence:     Fear of current or ex partner: Not on file     Emotionally abused: Not on file     Physically abused: Not on file     Forced sexual activity: Not on file   Other Topics Concern    Not on file   Social History Narrative    Not on file     Family History   Problem Relation Age of Onset    Heart Attack Other     Heart Failure Other         Congestive     Allergies   Allergen Reactions    Pradaxa [Dabigatran Etexilate] Not Reported This Time and Anaphylaxis    Shellfish Derived Nausea and Vomiting       No current facility-administered medications for this encounter.       Current Outpatient Medications   Medication Sig    spironolactone (ALDACTONE) 25 mg tablet Take 1 Tab by mouth daily.  furosemide (LASIX) 40 mg tablet Take 1 Tab by mouth as needed (for weight gain 3 lbs overnight/ 5 lbs in week). If light headedness occurs, take 0.5 tablet daily.  carvedilol (COREG) 12.5 mg tablet Take 1 Tab by mouth two (2) times daily (with meals).  dofetilide (TIKOSYN) 250 mcg capsule Take 1 Cap by mouth two (2) times a day.  omega-3 fatty acids-vitamin e 2,000-650-12 mg/2.5 gram elpk Take 2,000 mg by mouth daily.  ALPRAZolam (XANAX) 0.5 mg tablet Take 0.5 mg by mouth nightly as needed.  finasteride (PROSCAR) 5 mg tablet Take 5 mg by mouth daily.  multivitamin (ONE A DAY) tablet Take 1 Tab by mouth daily.  OTHER daily. Tumeric daily    aspirin 81 mg chewable tablet Take 81 mg by mouth daily.  levothyroxine (SYNTHROID) 125 mcg tablet Take 125 mcg by mouth Daily (before breakfast).  dutasteride (AVODART) 0.5 mg capsule Take 0.5 mg by mouth daily. Objective:     Vitals:    08/02/19 1550 08/02/19 1554 08/02/19 1559 08/02/19 1605   BP: 117/67 124/73 118/73 118/70   Pulse: 65 63 60 61   Resp: 18   20   Temp:       SpO2: 96% 95% 96% 96%   Weight:       Height:           PHYSICAL EXAM     Constitutional:  the patient is well developed, elderly and in no acute distress, room air sat 97%  EENMT:  Sclera clear, pupils equal, oral mucosa moist  Respiratory: few anterior crackles, right base crackles, diminished in left base. Productive cough at times with yellow sputum. Cardiovascular:  RRR with grade III/VI murmur, pacemaker left chest  Gastrointestinal: soft and non-tender; with positive bowel sounds. Musculoskeletal: warm without cyanosis. There is trace to 1+ bilateral lower extremity edema. Skin:  no jaundice or rashes, no wounds   Neurologic: no gross neuro deficits     Psychiatric:  alert and oriented x 3    AMBER 1/8/19:    -  Left ventricle: Systolic function markedly reduced. EF 20-25%. This study was inadequate for the evaluation of regional wall motion. -  Left atrial appendage: A 27mm Watchman device was successfully deployed. There was trival peridevice flow noted (insignificant with a vena contracta of 0.2-0.3 cm). -  Mitral valve: Mild annular calcification. Moderate to severe regurgitation. Chest CT 8/2/19:    Small-to-moderate left pleural effusion with atelectasis in the left lung base. CXR:    8/2/19:      Recent Labs     08/02/19  1045   WBC 8.3   HGB 14.8   HCT 44.8   *     Recent Labs     08/02/19  1045      K 3.5      *   CO2 24   BUN 32*   CREA 1.52*   CA 9.1   TROIQ 0.02   ALB 3.5   TBILI 1.2*   ALT 25   SGOT 27     No results for input(s): PH, PCO2, PO2, HCO3, PHI, PCO2I, PO2I, HCO3I in the last 72 hours. No results for input(s): LCAD, LAC in the last 72 hours. Assessment:  (Medical Decision Making)     Hospital Problems  Date Reviewed: 8/2/2019          Codes Class Noted POA    Pleural effusion ICD-10-CM: J90  ICD-9-CM: 511.9  8/2/2019 Yes        Hypoxia ICD-10-CM: R09.02  ICD-9-CM: 799.02  8/2/2019 Unknown        History of tobacco abuse (Chronic) ICD-10-CM: I17.310  ICD-9-CM: V15.82  8/2/2019 Unknown    Overview Signed 8/2/2019  3:13 PM by Rosa Zamarripa NP     Smoked pipe for most of adult life-quit about 6 years ago. * (Principal) Dyspnea ICD-10-CM: R06.00  ICD-9-CM: 786.09  9/15/2015 Yes        Ischemic cardiomyopathy ICD-10-CM: I25.5  ICD-9-CM: 414.8  6/3/2014 Yes              Plan:  (Medical Decision Making)     --BNP 1323--took 400mg Lasix over the last 12hrs  --CXR and Chest CT reviewed showing left effusion. Will check with ultrasound for possible thoracentesis.   --Has CKD, creat 1.52  --Is not on anticoagulation--stopped in December 2018    More than 50% of the time documented was spent in face-to-face contact with the patient and in the care of the patient on the floor/unit where the patient is located. Thank you very much for this referral.  We appreciate the opportunity to participate in this patient's care. Will follow along with above stated plan. Fredi Rogers NP      Lungs:  Decreased at left base  Heart:  RRR with no Murmur/Rubs/Gallops    Additional Comments:    Pt reports increasing SOB for the past 6 or more months. Some burning in his chest following hospitalization for possible PNA. Denies any wheeze. Now with L sided effusion. Unclear the cause of his more chronic dyspnea. Seems to be episodic, often at rest, sudden onset, with few if any associated symptoms and no obvious triggers. His effusion may be parapneumonic or related to heart failure. Needs thoracentesis and pleural fluid analysis. Given chest burning with deep breath and no response to albuterol would suggest inhaled steroid such as breo 100mg  Would send out with 5 days of prednisone 20mg. Collect sputum sample prior to discharge for routine culture. Follow up in pulmonary office in 2 weeks with CXR. I have spoken with and examined the patient. I agree with the above assessment and plan as documented.     Patricia Resendiz MD

## 2019-08-02 NOTE — DISCHARGE INSTRUCTIONS
Thoracentesis: What to Expect at Home  Your Recovery  Thoracentesis (say \"afnd-hj-ijz-AMBER-sis\") is a procedure to remove fluid from the space between the lungs and the chest wall (pleural space). This procedure may also be called a \"chest tap. \" It is normal to have a small amount of fluid in the pleural space. But too much fluid can build up because of problems such as infection, heart failure, or lung cancer. The procedure may have been done to help with shortness of breath and pain caused by the fluid buildup. Or you may have had this procedure so the doctor could test the fluid to find the cause of the buildup. Your chest may be sore where the doctor put the needle or catheter into your skin (the puncture site). This usually gets better after a day or two. You can go back to work or your normal activities as soon as you feel up to it. If the doctor sent the fluid to a lab for testing, it may take several days to get the results. The doctor or nurse will discuss the results with you. This care sheet gives you a general idea about how long it will take for you to recover. But each person recovers at a different pace. Follow the steps below to feel better as quickly as possible. How can you care for yourself at home? Activity    · Rest when you feel tired. Getting enough sleep will help you recover.     · Avoid strenuous activities, such as bicycle riding, jogging, weight lifting, or aerobic exercise, until your doctor says it is okay.     · You may shower. Do not take a bath until the puncture site has healed, or until your doctor tells you it is okay.     · Ask your doctor when you can drive again.     · You may need to take 1 or 2 days off from work. It depends on the type of work you do and how you feel. Diet    · You can eat your normal diet.     · Drink plenty of fluids (unless your doctor tells you not to). Medicines    · Your doctor will tell you if and when you can restart your medicines.  He or she will also give you instructions about taking any new medicines.     · If you take blood thinners, such as warfarin (Coumadin), clopidogrel (Plavix), or aspirin, be sure to talk to your doctor. He or she will tell you if and when to start taking those medicines again. Make sure that you understand exactly what your doctor wants you to do.     · Be safe with medicines. Take pain medicines exactly as directed. ? If the doctor gave you a prescription medicine for pain, take it as prescribed. ? If you are not taking a prescription pain medicine, ask your doctor if you can take an over-the-counter medicine. ? Do not take two or more pain medicines at the same time unless the doctor told you to. Many pain medicines have acetaminophen, which is Tylenol. Too much acetaminophen (Tylenol) can be harmful.     · If you think your pain medicine is making you sick to your stomach:  ? Take your medicine after meals (unless your doctor has told you not to). ? Ask your doctor for a different pain medicine.     · If your doctor prescribed antibiotics, take them as directed. Do not stop taking them just because you feel better. You need to take the full course of antibiotics.    Care of the puncture site    · Wash the area daily with warm, soapy water, and pat it dry. Don't use hydrogen peroxide or alcohol, which may delay healing. You may cover the area with a gauze bandage if it weeps or rubs against clothing. Change the bandage every day.     · Keep the area clean and dry. Follow-up care is a key part of your treatment and safety. Be sure to make and go to all appointments, and call your doctor if you are having problems. It's also a good idea to know your test results and keep a list of the medicines you take. When should you call for help? Call 911 anytime you think you may need emergency care.  For example, call if:    · You passed out (lost consciousness).     · You have severe trouble breathing.     · You have sudden chest pain and shortness of breath, or you cough up blood.    Call your doctor now or seek immediate medical care if:    · You have shortness of breath that is new or getting worse.     · You have new or worse pain in your chest, especially when you take a deep breath.     · You are sick to your stomach or cannot keep fluids down.     · You have a fever over 100°F.     · Bright red blood has soaked through the bandage over your puncture site.     · You have signs of infection, such as:  ? Increased pain, swelling, warmth, or redness. ? Red streaks leading from the puncture site. ? Pus draining from the puncture site. ? Swollen lymph nodes in your neck, armpits, or groin. ? A fever.     · You cough up a lot more mucus than normal, or your mucus changes color.    Watch closely for changes in your health, and be sure to contact your doctor if you have any problems. Where can you learn more? Go to http://danelle-johann.info/. Enter F246 in the search box to learn more about \"Thoracentesis: What to Expect at Home. \"  Current as of: September 5, 2018  Content Version: 12.1  © 7726-9963 [employer], [employer]. Care instructions adapted under license by [employer] (which disclaims liability or warranty for this information). If you have questions about a medical condition or this instruction, always ask your healthcare professional. [address] any warranty or liability for your use of this information.

## 2019-08-02 NOTE — ED NOTES
Patient report received from Emmy Miller, [address] [place] [address]. Patient care to be assumed at this time.

## 2019-08-04 LAB
GLUCOSE FLD-MCNC: 165 MG/DL
LDH FLD L TO P-CCNC: 59 U/L
PROT FLD-MCNC: 1.5 G/DL
SPECIMEN SOURCE FLD: NORMAL

## 2019-08-05 LAB
BACTERIA SPEC CULT: NORMAL
GRAM STN SPEC: NORMAL
GRAM STN SPEC: NORMAL
SERVICE CMNT-IMP: NORMAL

## 2019-08-16 ENCOUNTER — HOSPITAL ENCOUNTER (INPATIENT)
Age: 79
LOS: 3 days | Discharge: HOSPICE | DRG: 291 | End: 2019-08-19
Attending: EMERGENCY MEDICINE | Admitting: FAMILY MEDICINE
Payer: MEDICARE

## 2019-08-16 ENCOUNTER — APPOINTMENT (OUTPATIENT)
Dept: GENERAL RADIOLOGY | Age: 79
DRG: 291 | End: 2019-08-16
Attending: STUDENT IN AN ORGANIZED HEALTH CARE EDUCATION/TRAINING PROGRAM
Payer: MEDICARE

## 2019-08-16 ENCOUNTER — HOSPITAL ENCOUNTER (OUTPATIENT)
Dept: GENERAL RADIOLOGY | Age: 79
Discharge: HOME | DRG: 291 | End: 2019-08-16
Attending: INTERNAL MEDICINE
Payer: MEDICARE

## 2019-08-16 DIAGNOSIS — R09.02 HYPOXIA: ICD-10-CM

## 2019-08-16 DIAGNOSIS — J90 PLEURAL EFFUSION: ICD-10-CM

## 2019-08-16 DIAGNOSIS — I50.9 ACUTE ON CHRONIC CONGESTIVE HEART FAILURE, UNSPECIFIED HEART FAILURE TYPE (HCC): Primary | ICD-10-CM

## 2019-08-16 DIAGNOSIS — R06.02 SOB (SHORTNESS OF BREATH): ICD-10-CM

## 2019-08-16 DIAGNOSIS — R55 NEAR SYNCOPE: ICD-10-CM

## 2019-08-16 DIAGNOSIS — I25.5 ISCHEMIC CARDIOMYOPATHY: ICD-10-CM

## 2019-08-16 DIAGNOSIS — R53.81 DEBILITY: ICD-10-CM

## 2019-08-16 DIAGNOSIS — Z51.5 ENCOUNTER FOR PALLIATIVE CARE: ICD-10-CM

## 2019-08-16 DIAGNOSIS — R06.00 DYSPNEA, UNSPECIFIED TYPE: ICD-10-CM

## 2019-08-16 DIAGNOSIS — Z71.89 ADVANCED CARE PLANNING/COUNSELING DISCUSSION: ICD-10-CM

## 2019-08-16 PROBLEM — I48.91 A-FIB (HCC): Status: RESOLVED | Noted: 2018-11-28 | Resolved: 2019-08-16

## 2019-08-16 LAB
ALBUMIN SERPL-MCNC: 3.8 G/DL (ref 3.2–4.6)
ALBUMIN/GLOB SERPL: 0.9 {RATIO} (ref 1.2–3.5)
ALP SERPL-CCNC: 121 U/L (ref 50–136)
ALT SERPL-CCNC: 27 U/L (ref 12–65)
ANION GAP SERPL CALC-SCNC: 9 MMOL/L (ref 7–16)
ARTERIAL PATENCY WRIST A: YES
AST SERPL-CCNC: 34 U/L (ref 15–37)
BASE DEFICIT BLD-SCNC: 3 MMOL/L
BASOPHILS # BLD: 0.1 K/UL (ref 0–0.2)
BASOPHILS NFR BLD: 1 % (ref 0–2)
BDY SITE: ABNORMAL
BILIRUB SERPL-MCNC: 1.2 MG/DL (ref 0.2–1.1)
BNP SERPL-MCNC: 1164 PG/ML
BODY TEMPERATURE: 98.6
BUN SERPL-MCNC: 28 MG/DL (ref 8–23)
CALCIUM SERPL-MCNC: 10.1 MG/DL (ref 8.3–10.4)
CHLORIDE SERPL-SCNC: 103 MMOL/L (ref 98–107)
CO2 BLD-SCNC: 21 MMOL/L
CO2 SERPL-SCNC: 25 MMOL/L (ref 21–32)
COLLECT TIME,HTIME: 1732
CREAT SERPL-MCNC: 1.5 MG/DL (ref 0.8–1.5)
DIFFERENTIAL METHOD BLD: ABNORMAL
EOSINOPHIL # BLD: 0.1 K/UL (ref 0–0.8)
EOSINOPHIL NFR BLD: 1 % (ref 0.5–7.8)
ERYTHROCYTE [DISTWIDTH] IN BLOOD BY AUTOMATED COUNT: 17.5 % (ref 11.9–14.6)
GAS FLOW.O2 O2 DELIVERY SYS: ABNORMAL L/MIN
GLOBULIN SER CALC-MCNC: 4.1 G/DL (ref 2.3–3.5)
GLUCOSE SERPL-MCNC: 104 MG/DL (ref 65–100)
HCO3 BLD-SCNC: 20.3 MMOL/L (ref 22–26)
HCT VFR BLD AUTO: 48.2 % (ref 41.1–50.3)
HGB BLD-MCNC: 16 G/DL (ref 13.6–17.2)
IMM GRANULOCYTES # BLD AUTO: 0.1 K/UL (ref 0–0.5)
IMM GRANULOCYTES NFR BLD AUTO: 1 % (ref 0–5)
LYMPHOCYTES # BLD: 2.7 K/UL (ref 0.5–4.6)
LYMPHOCYTES NFR BLD: 29 % (ref 13–44)
MCH RBC QN AUTO: 32.5 PG (ref 26.1–32.9)
MCHC RBC AUTO-ENTMCNC: 33.2 G/DL (ref 31.4–35)
MCV RBC AUTO: 97.8 FL (ref 79.6–97.8)
MONOCYTES # BLD: 0.7 K/UL (ref 0.1–1.3)
MONOCYTES NFR BLD: 8 % (ref 4–12)
NEUTS SEG # BLD: 5.6 K/UL (ref 1.7–8.2)
NEUTS SEG NFR BLD: 61 % (ref 43–78)
NRBC # BLD: 0 K/UL (ref 0–0.2)
O2/TOTAL GAS SETTING VFR VENT: 21 %
PCO2 BLD: 30.7 MMHG (ref 35–45)
PH BLD: 7.43 [PH] (ref 7.35–7.45)
PLATELET # BLD AUTO: 108 K/UL (ref 150–450)
PMV BLD AUTO: 12.1 FL (ref 9.4–12.3)
PO2 BLD: 75 MMHG (ref 75–100)
POTASSIUM SERPL-SCNC: 4.5 MMOL/L (ref 3.5–5.1)
PROT SERPL-MCNC: 7.9 G/DL (ref 6.3–8.2)
RBC # BLD AUTO: 4.93 M/UL (ref 4.23–5.6)
SAO2 % BLD: 95 % (ref 95–98)
SERVICE CMNT-IMP: ABNORMAL
SERVICE CMNT-IMP: ABNORMAL
SODIUM SERPL-SCNC: 137 MMOL/L (ref 136–145)
SPECIMEN TYPE: ABNORMAL
TROPONIN I SERPL-MCNC: 0.02 NG/ML (ref 0.02–0.05)
WBC # BLD AUTO: 9.1 K/UL (ref 4.3–11.1)

## 2019-08-16 PROCEDURE — 36600 WITHDRAWAL OF ARTERIAL BLOOD: CPT

## 2019-08-16 PROCEDURE — 82803 BLOOD GASES ANY COMBINATION: CPT

## 2019-08-16 PROCEDURE — 99284 EMERGENCY DEPT VISIT MOD MDM: CPT | Performed by: EMERGENCY MEDICINE

## 2019-08-16 PROCEDURE — 71046 X-RAY EXAM CHEST 2 VIEWS: CPT

## 2019-08-16 PROCEDURE — 74011250636 HC RX REV CODE- 250/636: Performed by: EMERGENCY MEDICINE

## 2019-08-16 PROCEDURE — 84484 ASSAY OF TROPONIN QUANT: CPT

## 2019-08-16 PROCEDURE — 74011250637 HC RX REV CODE- 250/637: Performed by: FAMILY MEDICINE

## 2019-08-16 PROCEDURE — 85025 COMPLETE CBC W/AUTO DIFF WBC: CPT

## 2019-08-16 PROCEDURE — 65660000000 HC RM CCU STEPDOWN

## 2019-08-16 PROCEDURE — 83880 ASSAY OF NATRIURETIC PEPTIDE: CPT

## 2019-08-16 PROCEDURE — 74011250636 HC RX REV CODE- 250/636: Performed by: FAMILY MEDICINE

## 2019-08-16 PROCEDURE — 93005 ELECTROCARDIOGRAM TRACING: CPT | Performed by: EMERGENCY MEDICINE

## 2019-08-16 PROCEDURE — 80053 COMPREHEN METABOLIC PANEL: CPT

## 2019-08-16 PROCEDURE — 96374 THER/PROPH/DIAG INJ IV PUSH: CPT | Performed by: EMERGENCY MEDICINE

## 2019-08-16 RX ORDER — GUAIFENESIN 100 MG/5ML
81 LIQUID (ML) ORAL DAILY
Status: DISCONTINUED | OUTPATIENT
Start: 2019-08-17 | End: 2019-08-19 | Stop reason: HOSPADM

## 2019-08-16 RX ORDER — DUTASTERIDE 0.5 MG/1
0.5 CAPSULE, LIQUID FILLED ORAL DAILY
Status: DISCONTINUED | OUTPATIENT
Start: 2019-08-17 | End: 2019-08-16

## 2019-08-16 RX ORDER — FUROSEMIDE 10 MG/ML
80 INJECTION INTRAMUSCULAR; INTRAVENOUS EVERY 12 HOURS
Status: DISCONTINUED | OUTPATIENT
Start: 2019-08-16 | End: 2019-08-19

## 2019-08-16 RX ORDER — FUROSEMIDE 10 MG/ML
60 INJECTION INTRAMUSCULAR; INTRAVENOUS
Status: COMPLETED | OUTPATIENT
Start: 2019-08-16 | End: 2019-08-16

## 2019-08-16 RX ORDER — DOFETILIDE 0.25 MG/1
250 CAPSULE ORAL EVERY 12 HOURS
Status: DISCONTINUED | OUTPATIENT
Start: 2019-08-16 | End: 2019-08-19 | Stop reason: HOSPADM

## 2019-08-16 RX ORDER — FINASTERIDE 5 MG/1
5 TABLET, FILM COATED ORAL DAILY
Status: DISCONTINUED | OUTPATIENT
Start: 2019-08-17 | End: 2019-08-19 | Stop reason: HOSPADM

## 2019-08-16 RX ORDER — ALPRAZOLAM 0.5 MG/1
0.5 TABLET ORAL
Status: DISCONTINUED | OUTPATIENT
Start: 2019-08-16 | End: 2019-08-19 | Stop reason: HOSPADM

## 2019-08-16 RX ORDER — LEVOTHYROXINE SODIUM 125 UG/1
125 TABLET ORAL
Status: DISCONTINUED | OUTPATIENT
Start: 2019-08-17 | End: 2019-08-19 | Stop reason: HOSPADM

## 2019-08-16 RX ORDER — LISINOPRIL 5 MG/1
5 TABLET ORAL DAILY
Status: DISCONTINUED | OUTPATIENT
Start: 2019-08-17 | End: 2019-08-19 | Stop reason: HOSPADM

## 2019-08-16 RX ORDER — CARVEDILOL 12.5 MG/1
12.5 TABLET ORAL 2 TIMES DAILY WITH MEALS
Status: DISCONTINUED | OUTPATIENT
Start: 2019-08-17 | End: 2019-08-19 | Stop reason: HOSPADM

## 2019-08-16 RX ORDER — ENOXAPARIN SODIUM 100 MG/ML
40 INJECTION SUBCUTANEOUS EVERY 24 HOURS
Status: DISCONTINUED | OUTPATIENT
Start: 2019-08-16 | End: 2019-08-16 | Stop reason: ALTCHOICE

## 2019-08-16 RX ORDER — ALBUTEROL SULFATE 0.83 MG/ML
2.5 SOLUTION RESPIRATORY (INHALATION)
Status: DISCONTINUED | OUTPATIENT
Start: 2019-08-17 | End: 2019-08-19 | Stop reason: HOSPADM

## 2019-08-16 RX ORDER — NALOXONE HYDROCHLORIDE 0.4 MG/ML
0.4 INJECTION, SOLUTION INTRAMUSCULAR; INTRAVENOUS; SUBCUTANEOUS AS NEEDED
Status: DISCONTINUED | OUTPATIENT
Start: 2019-08-16 | End: 2019-08-19 | Stop reason: HOSPADM

## 2019-08-16 RX ORDER — SPIRONOLACTONE 25 MG/1
25 TABLET ORAL DAILY
Status: DISCONTINUED | OUTPATIENT
Start: 2019-08-17 | End: 2019-08-19 | Stop reason: HOSPADM

## 2019-08-16 RX ORDER — HEPARIN SODIUM 5000 [USP'U]/ML
5000 INJECTION, SOLUTION INTRAVENOUS; SUBCUTANEOUS EVERY 8 HOURS
Status: DISCONTINUED | OUTPATIENT
Start: 2019-08-16 | End: 2019-08-19 | Stop reason: HOSPADM

## 2019-08-16 RX ORDER — BUDESONIDE 0.5 MG/2ML
500 INHALANT ORAL
Status: DISCONTINUED | OUTPATIENT
Start: 2019-08-17 | End: 2019-08-19 | Stop reason: HOSPADM

## 2019-08-16 RX ORDER — FUROSEMIDE 40 MG/1
40 TABLET ORAL
Status: DISCONTINUED | OUTPATIENT
Start: 2019-08-16 | End: 2019-08-19 | Stop reason: HOSPADM

## 2019-08-16 RX ORDER — BISACODYL 5 MG
10 TABLET, DELAYED RELEASE (ENTERIC COATED) ORAL DAILY PRN
Status: DISCONTINUED | OUTPATIENT
Start: 2019-08-16 | End: 2019-08-19 | Stop reason: HOSPADM

## 2019-08-16 RX ORDER — HYDROCODONE BITARTRATE AND ACETAMINOPHEN 5; 325 MG/1; MG/1
1 TABLET ORAL
Status: DISCONTINUED | OUTPATIENT
Start: 2019-08-16 | End: 2019-08-19 | Stop reason: HOSPADM

## 2019-08-16 RX ORDER — ACETAMINOPHEN 325 MG/1
650 TABLET ORAL
Status: DISCONTINUED | OUTPATIENT
Start: 2019-08-16 | End: 2019-08-19 | Stop reason: HOSPADM

## 2019-08-16 RX ADMIN — DOFETILIDE 250 MCG: 0.25 CAPSULE ORAL at 23:27

## 2019-08-16 RX ADMIN — FUROSEMIDE 80 MG: 10 INJECTION, SOLUTION INTRAMUSCULAR; INTRAVENOUS at 23:36

## 2019-08-16 RX ADMIN — FUROSEMIDE 60 MG: 10 INJECTION, SOLUTION INTRAMUSCULAR; INTRAVENOUS at 17:41

## 2019-08-16 RX ADMIN — ALPRAZOLAM 0.5 MG: 0.5 TABLET ORAL at 23:28

## 2019-08-16 NOTE — ED NOTES
TRANSFER - OUT REPORT:    Verbal report given to DEVIN Joe on Noel Gould  being transferred to [facility](unit) for routine progression. Report consisted of patients Situation, Background, Assessment and   Recommendations(SBAR). Information from the following report(s) SBAR, Kardex, ED Summary, Procedure Summary, Intake/Output and Recent Results was reviewed with the receiving nurse. Lines:   Peripheral IV 08/16/19 Left Hand (Active)   Site Assessment Clean, dry, & intact 8/16/2019  1:28 PM   Phlebitis Assessment 0 8/16/2019  1:28 PM   Infiltration Assessment 0 8/16/2019  1:28 PM   Dressing Status Clean, dry, & intact 8/16/2019  1:28 PM        Opportunity for questions and clarification was provided.       Patient transported with:   [employer]

## 2019-08-16 NOTE — PROGRESS NOTES
TRANSFER - IN REPORT:    Verbal report received from [facility]) on Linda Jc  being received from ED(unit) for routine progression of care      Report consisted of patients Situation, Background, Assessment and   Recommendations(SBAR). Information from the following report(s) SBAR was reviewed with the receiving nurse. Opportunity for questions and clarification was provided. Assessment completed upon patients arrival to unit and care assumed.

## 2019-08-16 NOTE — ED TRIAGE NOTES
Pt arrives via w/c from pulmonology office for new onset pedal edema with hx CHF. A/o x 4. Denies pain.

## 2019-08-16 NOTE — ED PROVIDER NOTES
71-year-old male with multiple medical problems including congestive heart failure, coronary artery disease, atrial fib/ flutter, CABG, AICD, paroxysmal ventricular tachycardia, valvular disease presents with shortness of breath, lightheadedness and 2 near syncopal episodes today. He has had increased leg swelling over the past 2 days. He was seen in the emergency department August 2 for shortness of breath. He was found to have pleural effusions. Pulmonary performed thoracentesis with 500 cc of cloudy fluid. There was no malignancy. Dyspnea improved until today. He had a follow-up appointment today, but due to a near-syncopal episode was sent to the emergency department instead. He has been taking between 40 and 80 mg of Lasix at home. Unknown weight gain. He has a cough with thick green sputum. He denies fevers or chest pain. Shortness of breath worse with any attempted exertion. Treated last month for pneumonia in [facility]. Last echo 1/19:  SUMMARY:    -  Left ventricle: Systolic function was markedly reduced. Ejection fraction  was estimated in the range of 20 % to 25 %. This study was inadequate for the  evaluation of regional wall motion. -  Left atrial appendage: A 27mm Watchman device was successfully deployed. There was trival peridevice flow noted (insignificant with a vena contracta   of  0.2-0.3 cm). -  Mitral valve: There was mild annular calcification. There was moderate to  severe regurgitation.     Prepared and signed by    Apollo Kiran MD  Signed 11-Jan-2019 12:39:49           Past Medical History:   Diagnosis Date    Acute diastolic CHF (congestive heart failure) ([address] 9/15/2015    Arrhythmia     afib, icd    Atypical atrial flutter ([address] 4/14/2016    Biventricular ICD (implantable cardioverter-defibrillator) in place 2004    CAD (coronary artery disease)     stents heart 2002, cabg 2002    Chest pain 9/15/2015    Chronic systolic CHF (congestive heart failure) ([address] 2016    Coronary atherosclerosis of native coronary vessel 9/15/2015    Dyspnea 9/15/2015    Essential hypertension, benign 9/15/2015    Heart failure ([address]     Hx of CABG 2004    Hyperlipidemia 9/15/2015    Hypertension     Hypothyroidism 6/3/2014    Ischemic cardiomyopathy 6/3/2014    Long-term use of high-risk medication 2016    Myocardial infarction acute ([address] 9/15/2015    Other and unspecified angina pectoris 9/15/2015    Palpitations 9/15/2015    Paroxysmal ventricular tachycardia ([address] 9/15/2015    Persistent atrial fibrillation ([address] 2016    1. Attempt A. Fib ablation at [address] -  2.  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    Unstable angina ([address] 9/15/2015       Past Surgical History:   Procedure Laterality Date    CARDIAC SURG PROCEDURE UNLIST      cabg 2004    HX HEENT      tonsillectomy    HX PACEMAKER      2004         Family History:   Problem Relation Age of Onset    Heart Attack Other     Heart Failure Other         Congestive       Social History     Socioeconomic History    Marital status:      Spouse name: Not on file    Number of children: Not on file    Years of education: Not on file    Highest education level: Not on file   Occupational History    Not on file   Social Needs    Financial resource strain: Not on file    Food insecurity:     Worry: Not on file     Inability: Not on file    Transportation needs:     Medical: Not on file     Non-medical: Not on file   Tobacco Use    Smoking status: Former Smoker     Last attempt to quit: 2003     Years since quittin.6    Smokeless tobacco: Never Used    Tobacco comment: pipe quit 10 years ago   Substance and Sexual Activity    Alcohol use: No    Drug use: No    Sexual activity: Not on file   Lifestyle    Physical activity:     Days per week: Not on file     Minutes per session: Not on file    Stress: Not on file   Relationships    Social connections:     Talks on phone: Not on file     Gets together: Not on file     Attends Bahai service: Not on file     Active member of club or organization: Not on file     Attends meetings of clubs or organizations: Not on file     Relationship status: Not on file    Intimate partner violence:     Fear of current or ex partner: Not on file     Emotionally abused: Not on file     Physically abused: Not on file     Forced sexual activity: Not on file   Other Topics Concern    Not on file   Social History Narrative    Not on file         ALLERGIES: Pradaxa [dabigatran etexilate] and Shellfish derived    Review of Systems   Constitutional: Positive for fatigue. Negative for chills and fever. HENT: Negative for hearing loss. Eyes: Negative for visual disturbance. Respiratory: Positive for cough and shortness of breath. Cardiovascular: Positive for leg swelling. Negative for chest pain and palpitations. Gastrointestinal: Negative for abdominal pain, diarrhea, nausea and vomiting. Genitourinary: Negative for difficulty urinating. Musculoskeletal: Negative for back pain. Skin: Negative for rash. Neurological: Positive for light-headedness. Negative for weakness and headaches. Psychiatric/Behavioral: Negative for confusion. Vitals:    08/16/19 1325   BP: 114/78   Pulse: 65   Resp: 16   Temp: 97.3 °F (36.3 °C)   SpO2: 95%   Weight: 85.4 kg (188 lb 3.2 oz)   Height: 5' 11\" (1.803 m)            Physical Exam   Constitutional: He appears well-developed and well-nourished. HENT:   Head: Normocephalic and atraumatic. Eyes: Pupils are equal, round, and reactive to light. EOM are normal.   Neck: Normal range of motion. Neck supple. Cardiovascular: Regular rhythm. Murmur heard. Pulmonary/Chest: Effort normal. He has decreased breath sounds. Abdominal: Soft. There is no tenderness. Musculoskeletal: Normal range of motion. Neurological: He is alert. Skin: Skin is warm and dry. Psychiatric: His behavior is normal.   Nursing note and vitals reviewed. MDM  Number of Diagnoses or Management Options  Acute on chronic congestive heart failure, unspecified heart failure type [facility]):   Near syncope:   Diagnosis management comments: Parts of this document were created using dragon voice recognition software. The chart has been reviewed but errors may still be present. 6:41 PM  Had CXR at pulmonary today, so not repeated. Worsening dyspnea with edema, elevated BNP, and near syncope. D/w hospitalist for admission.        Amount and/or Complexity of Data Reviewed  Clinical lab tests: reviewed and ordered (Results for orders placed or performed during the hospital encounter of 08/16/19  -CBC WITH AUTOMATED DIFF       Result                      Value             Ref Range           WBC                         9.1               4.3 - 11.1 K*       RBC                         4.93              4.23 - 5.6 M*       HGB                         16.0              13.6 - 17.2 *       HCT                         48.2              41.1 - 50.3 %       MCV                         97.8              79.6 - 97.8 *       MCH                         32.5              26.1 - 32.9 *       MCHC                        33.2              31.4 - 35.0 *       RDW                         17.5 (H)          11.9 - 14.6 %       PLATELET                    108 (L)           150 - 450 K/*       MPV                         12.1              9.4 - 12.3 FL       ABSOLUTE NRBC               0.00              0.0 - 0.2 K/*       DF                          AUTOMATED                             NEUTROPHILS                 61                43 - 78 %           LYMPHOCYTES                 29                13 - 44 %           MONOCYTES                   8                 4.0 - 12.0 %        EOSINOPHILS                 1 0.5 - 7.8 %         BASOPHILS                   1                 0.0 - 2.0 %         IMMATURE GRANULOCYTES       1                 0.0 - 5.0 %         ABS. NEUTROPHILS            5.6               1.7 - 8.2 K/*       ABS. LYMPHOCYTES            2.7               0.5 - 4.6 K/*       ABS. MONOCYTES              0.7               0.1 - 1.3 K/*       ABS. EOSINOPHILS            0.1               0.0 - 0.8 K/*       ABS. BASOPHILS              0.1               0.0 - 0.2 K/*       ABS. IMM. GRANS.            0.1               0.0 - 0.5 K/*  -BNP       Result                      Value             Ref Range           BNP                         1,164 (H)         0 pg/mL        -METABOLIC PANEL, COMPREHENSIVE       Result                      Value             Ref Range           Sodium                      137               136 - 145 mm*       Potassium                   4.5               3.5 - 5.1 mm*       Chloride                    103               98 - 107 mmo*       CO2                         25                21 - 32 mmol*       Anion gap                   9                 7 - 16 mmol/L       Glucose                     104 (H)           65 - 100 mg/*       BUN                         28 (H)            8 - 23 MG/DL        Creatinine                  1.50              0.8 - 1.5 MG*       GFR est AA                  58 (L)            >60 ml/min/1*       GFR est non-AA              48 (L)            >60 ml/min/1*       Calcium                     10.1              8.3 - 10.4 M*       Bilirubin, total            1.2 (H)           0.2 - 1.1 MG*       ALT (SGPT)                  27                12 - 65 U/L         AST (SGOT)                  34                15 - 37 U/L         Alk.  phosphatase            121               50 - 136 U/L        Protein, total              7.9               6.3 - 8.2 g/*       Albumin                     3.8               3.2 - 4.6 g/*       Globulin                    4.1 (H) 2.3 - 3.5 g/*       A-G Ratio                   0.9 (L)           1.2 - 3.5      -TROPONIN I       Result                      Value             Ref Range           Troponin-I, Qt.             0.02              0.02 - 0.05 *  )  Tests in the radiology section of CPT®: ordered and reviewed (Xr Chest Pa Lat    Result Date: 8/16/2019  PA and lateral chest radiographs History: f/u post bronchoscopy, 67 years Male Comparison: Chest radiograph August 02, 2019 Findings: Cardiac pacing device obscures part of the left hemithorax, leads appear to remain in anatomic position. Partially obscured cardiomediastinal silhouette with evidence of CABG. Multiple broken sternal wires appear unchanged. Persistent small left pleural effusion with associated left basilar atelectasis and or consolidation. Probable mild interstitial edema unchanged. No evidence of pneumothorax. Visualized soft tissue and osseous structures otherwise unremarkable.       Impression:   No significant interval change.    )  Tests in the medicine section of CPT®: ordered and reviewed           Procedures

## 2019-08-17 VITALS — OXYGEN SATURATION: 97 %

## 2019-08-17 PROBLEM — R55 SYNCOPE: Status: ACTIVE | Noted: 2019-08-17

## 2019-08-17 LAB
ALBUMIN SERPL-MCNC: 3.4 G/DL (ref 3.2–4.6)
ALBUMIN/GLOB SERPL: 0.9 {RATIO} (ref 1.2–3.5)
ALP SERPL-CCNC: 116 U/L (ref 50–136)
ALT SERPL-CCNC: 20 U/L (ref 12–65)
ANION GAP SERPL CALC-SCNC: 11 MMOL/L (ref 7–16)
AST SERPL-CCNC: 22 U/L (ref 15–37)
ATRIAL RATE: 53 BPM
BASOPHILS # BLD: 0.1 K/UL (ref 0–0.2)
BASOPHILS NFR BLD: 1 % (ref 0–2)
BILIRUB SERPL-MCNC: 1 MG/DL (ref 0.2–1.1)
BUN SERPL-MCNC: 30 MG/DL (ref 8–23)
CALCIUM SERPL-MCNC: 9.3 MG/DL (ref 8.3–10.4)
CALCULATED R AXIS, ECG10: -174 DEGREES
CALCULATED T AXIS, ECG11: -20 DEGREES
CHLORIDE SERPL-SCNC: 102 MMOL/L (ref 98–107)
CO2 SERPL-SCNC: 25 MMOL/L (ref 21–32)
CREAT SERPL-MCNC: 1.47 MG/DL (ref 0.8–1.5)
DIAGNOSIS, 93000: NORMAL
DIFFERENTIAL METHOD BLD: ABNORMAL
EOSINOPHIL # BLD: 0.1 K/UL (ref 0–0.8)
EOSINOPHIL NFR BLD: 1 % (ref 0.5–7.8)
ERYTHROCYTE [DISTWIDTH] IN BLOOD BY AUTOMATED COUNT: 17.2 % (ref 11.9–14.6)
GLOBULIN SER CALC-MCNC: 3.9 G/DL (ref 2.3–3.5)
GLUCOSE SERPL-MCNC: 91 MG/DL (ref 65–100)
HCT VFR BLD AUTO: 45.5 % (ref 41.1–50.3)
HGB BLD-MCNC: 15.2 G/DL (ref 13.6–17.2)
IMM GRANULOCYTES # BLD AUTO: 0 K/UL (ref 0–0.5)
IMM GRANULOCYTES NFR BLD AUTO: 0 % (ref 0–5)
LYMPHOCYTES # BLD: 3 K/UL (ref 0.5–4.6)
LYMPHOCYTES NFR BLD: 32 % (ref 13–44)
MCH RBC QN AUTO: 32.3 PG (ref 26.1–32.9)
MCHC RBC AUTO-ENTMCNC: 33.4 G/DL (ref 31.4–35)
MCV RBC AUTO: 96.8 FL (ref 79.6–97.8)
MONOCYTES # BLD: 0.9 K/UL (ref 0.1–1.3)
MONOCYTES NFR BLD: 9 % (ref 4–12)
NEUTS SEG # BLD: 5.4 K/UL (ref 1.7–8.2)
NEUTS SEG NFR BLD: 57 % (ref 43–78)
NRBC # BLD: 0 K/UL (ref 0–0.2)
PLATELET # BLD AUTO: 117 K/UL (ref 150–450)
PMV BLD AUTO: 12 FL (ref 9.4–12.3)
POTASSIUM SERPL-SCNC: 3.8 MMOL/L (ref 3.5–5.1)
PROT SERPL-MCNC: 7.3 G/DL (ref 6.3–8.2)
Q-T INTERVAL, ECG07: 304 MS
QRS DURATION, ECG06: 180 MS
QTC CALCULATION (BEZET), ECG08: 390 MS
RBC # BLD AUTO: 4.7 M/UL (ref 4.23–5.6)
SODIUM SERPL-SCNC: 138 MMOL/L (ref 136–145)
VENTRICULAR RATE, ECG03: 99 BPM
WBC # BLD AUTO: 9.5 K/UL (ref 4.3–11.1)

## 2019-08-17 PROCEDURE — 94640 AIRWAY INHALATION TREATMENT: CPT

## 2019-08-17 PROCEDURE — 74011250637 HC RX REV CODE- 250/637: Performed by: FAMILY MEDICINE

## 2019-08-17 PROCEDURE — 74011250636 HC RX REV CODE- 250/636: Performed by: FAMILY MEDICINE

## 2019-08-17 PROCEDURE — 85025 COMPLETE CBC W/AUTO DIFF WBC: CPT

## 2019-08-17 PROCEDURE — 65660000000 HC RM CCU STEPDOWN

## 2019-08-17 PROCEDURE — 77010033678 HC OXYGEN DAILY

## 2019-08-17 PROCEDURE — 94760 N-INVAS EAR/PLS OXIMETRY 1: CPT

## 2019-08-17 PROCEDURE — C8929 TTE W OR WO FOL WCON,DOPPLER: HCPCS

## 2019-08-17 PROCEDURE — 74011000250 HC RX REV CODE- 250: Performed by: FAMILY MEDICINE

## 2019-08-17 PROCEDURE — 36415 COLL VENOUS BLD VENIPUNCTURE: CPT

## 2019-08-17 PROCEDURE — 80053 COMPREHEN METABOLIC PANEL: CPT

## 2019-08-17 RX ADMIN — ALBUTEROL SULFATE 2.5 MG: 2.5 SOLUTION RESPIRATORY (INHALATION) at 08:41

## 2019-08-17 RX ADMIN — SPIRONOLACTONE 25 MG: 25 TABLET ORAL at 09:21

## 2019-08-17 RX ADMIN — FUROSEMIDE 80 MG: 10 INJECTION, SOLUTION INTRAMUSCULAR; INTRAVENOUS at 09:19

## 2019-08-17 RX ADMIN — DOFETILIDE 250 MCG: 0.25 CAPSULE ORAL at 21:51

## 2019-08-17 RX ADMIN — BUDESONIDE 500 MCG: 0.5 INHALANT RESPIRATORY (INHALATION) at 08:41

## 2019-08-17 RX ADMIN — HEPARIN SODIUM 5000 UNITS: 5000 INJECTION INTRAVENOUS; SUBCUTANEOUS at 10:42

## 2019-08-17 RX ADMIN — CARVEDILOL 12.5 MG: 12.5 TABLET, FILM COATED ORAL at 17:04

## 2019-08-17 RX ADMIN — ALPRAZOLAM 0.5 MG: 0.5 TABLET ORAL at 21:59

## 2019-08-17 RX ADMIN — FUROSEMIDE 80 MG: 10 INJECTION, SOLUTION INTRAMUSCULAR; INTRAVENOUS at 21:56

## 2019-08-17 RX ADMIN — PERFLUTREN 1 ML: 6.52 INJECTION, SUSPENSION INTRAVENOUS at 13:00

## 2019-08-17 RX ADMIN — HEPARIN SODIUM 5000 UNITS: 5000 INJECTION INTRAVENOUS; SUBCUTANEOUS at 03:40

## 2019-08-17 RX ADMIN — CARVEDILOL 12.5 MG: 12.5 TABLET, FILM COATED ORAL at 09:21

## 2019-08-17 RX ADMIN — FINASTERIDE 5 MG: 5 TABLET, FILM COATED ORAL at 09:21

## 2019-08-17 RX ADMIN — ASPIRIN 81 MG 81 MG: 81 TABLET ORAL at 09:21

## 2019-08-17 RX ADMIN — DOFETILIDE 250 MCG: 0.25 CAPSULE ORAL at 10:41

## 2019-08-17 NOTE — CONSULTS
[address], [address] [employer] [facility], [address] [place] [address]  PHONE: [phone]      19      NAME:  Karyna Soto  : 1940  MRN: [id number]       Reason for consult: SHF, CAD, Syncope, weakness, Afib, h/o VT      HPI:  This is a 69yo WM well known to our service with long standing SHF, ICM, CAD, s/p 4v CABG , h/o pAF s/p ablation and watchman device, MDT BiV-ICD who was admitted for weakness and SOB. He has struggled since PNA several weeks ago, more ROBERTO, weakness. Presented to pulmonary office yesterday for follow up on pleural fluid and was sent to [facility] for admission. Family at the bedside, echo being done now. No recent CP, just SOB, ROBERTO. He has NYHA Class III-IV sx now. No ICD shocks. Pos orthopnea. I have reviewed the prior cardiac testing and results with the patient today. Notable Cardiac PMH:  1.  Coronary artery disease status post four-vessel CABG in 2004. 2.  History of myocardial infarction as early as . 3.  Biventricular ICD since 2004. 4.  Laser lead extraction of recalled Medtronic ICD lead in 2011 by Dr. Froilan Galvez. 5.  Atrial lead damage at the time of laser lead extraction.    6.  Currently has a Medtronic biventricular ICD implanted in 2011. 7.  Moderate to severe mitral regurgitation. 8.  Paroxysmal atrial fibrillation. 9.  Experimental stem cell transplant in [facility] in .    10. Complete thrombosis of left subclavian vein. 11. Chronic systolic heart failure, EF 25%. 12. Ventricular tachycardia. 13. ECHO - 2014 - EF 15%, mod MR  14. CV - 2014 - Able to maintain NSR for about 1 week  15. CV - 2014 - Able to maintain NSR with amio  16. ECHO - Aug 2014 - EF<15%  17. A. Fib ablation - Oct 2014 - done at [facility]. Gen Change - MDT - Aug 2015  19. CV - Tikosyn loading - May 2016  20. Acute Amio reaction - May 2016  21.  ECHO - Aug 2016 - EF 15-20%, Mod MR      Past Medical History, Past Surgical History, Family history, Social History, and Medications were all reviewed with the patient today and updated as necessary. Patient Active Problem List   Diagnosis Code    Ischemic cardiomyopathy I25.5    CAD (coronary artery disease) I25.10    Biventricular ICD (implantable cardioverter-defibrillator) in place Z95.810    Hypothyroidism E03.9    Hyperlipidemia E78.5    Essential hypertension, benign I10    Paroxysmal ventricular tachycardia (HCC) I47.2    Coronary atherosclerosis of native coronary vessel I25.10    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    Unstable angina (HCC) I20.0    SOB (shortness of breath) R06.02    PVC (premature ventricular contraction) I49.3    Acute diastolic CHF (congestive heart failure) ([facility]) I50.31    Other and unspecified angina pectoris I20.9    Atypical atrial flutter (HCC) I48.4    Chronic systolic CHF (congestive heart failure) ([facility]) I50.22    Persistent atrial fibrillation (HCC) I48.1    Long-term use of high-risk medication Z79.899    Congestive heart failure (HCC) I50.9    Chronic kidney disease N18.9    Coronary arteriosclerosis in native artery I25.10    History of high risk medication treatment Z92.29    Hypertension I10    History of coronary artery bypass surgery Z95.1    Cardiac defibrillator in place Z95.810    Myocardial infarction ([facility]) I21.9    Chronic ischemic heart disease I25.9    History of cardiovascular disorder Z86.79    Subclavian vein thrombosis ([facility]) I82. B19    Ventricular tachycardia (HCC) I47.2    Arrhythmia I49.9    Heart failure (HCC) I50.9    Hx of CABG Z95.1    Pleural effusion J90    Hypoxia R09.02    History of tobacco abuse Z87.891         Past Medical History:   Diagnosis Date    Acute diastolic CHF (congestive heart failure) ([facility]) 9/15/2015    Arrhythmia     afib, icd    Atypical atrial flutter ([facility]) 4/14/2016    Biventricular ICD (implantable cardioverter-defibrillator) in place 2004  CAD (coronary artery disease)     stents heart 2002, cabg 2002    Chest pain 9/15/2015    Chronic systolic CHF (congestive heart failure) [facility] 2016    Coronary atherosclerosis of native coronary vessel 9/15/2015    Dyspnea 9/15/2015    Essential hypertension, benign 9/15/2015    Heart failure ([address]     Hx of CABG 2004    Hyperlipidemia 9/15/2015    Hypertension     Hypothyroidism 6/3/2014    Ischemic cardiomyopathy 6/3/2014    Long-term use of high-risk medication 2016    Myocardial infarction acute ([address] 9/15/2015    Other and unspecified angina pectoris 9/15/2015    Palpitations 9/15/2015    Paroxysmal ventricular tachycardia ([address] 9/15/2015    Persistent atrial fibrillation ([address] 2016    1. Attempt A. Fib ablation at [address] -  2.  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    Unstable angina ([address] 9/15/2015     Past Surgical History:   Procedure Laterality Date    CARDIAC SURG PROCEDURE UNLIST      cabg 2004    HX HEENT      tonsillectomy    HX PACEMAKER      2004     Family History   Problem Relation Age of Onset    Heart Attack Other     Heart Failure Other         Congestive     Social History     Tobacco Use    Smoking status: Former Smoker     Last attempt to quit: 2003     Years since quittin.6    Smokeless tobacco: Never Used    Tobacco comment: pipe quit 10 years ago   Substance Use Topics    Alcohol use: No         Allergies   Allergen Reactions    Pradaxa [Dabigatran Etexilate] Not Reported This Time and Anaphylaxis    Shellfish Derived Nausea and Vomiting       Current Facility-Administered Medications   Medication Dose Route Frequency    acetaminophen (TYLENOL) tablet 650 mg  650 mg Oral Q4H PRN    HYDROcodone-acetaminophen (NORCO) 5-325 mg per tablet 1 Tab  1 Tab Oral Q4H PRN    naloxone (NARCAN) injection 0.4 mg  0.4 mg IntraVENous PRN    bisacodyl (DULCOLAX) tablet 10 mg  10 mg Oral DAILY PRN    heparin (porcine) injection 5,000 Units  5,000 Units SubCUTAneous Q8H    ALPRAZolam (XANAX) tablet 0.5 mg  0.5 mg Oral QHS PRN    aspirin chewable tablet 81 mg  81 mg Oral DAILY    carvedilol (COREG) tablet 12.5 mg  12.5 mg Oral BID WITH MEALS    dofetilide (TIKOSYN) capsule 250 mcg  250 mcg Oral Q12H    finasteride (PROSCAR) tablet 5 mg  5 mg Oral DAILY    furosemide (LASIX) tablet 40 mg  40 mg Oral DAILY PRN    levothyroxine (SYNTHROID) tablet 125 mcg  125 mcg Oral ACB    spironolactone (ALDACTONE) tablet 25 mg  25 mg Oral DAILY    lisinopril (PRINIVIL, ZESTRIL) tablet 5 mg  5 mg Oral DAILY    budesonide (PULMICORT) 500 mcg/2 ml nebulizer suspension  500 mcg Nebulization BID RT    And    albuterol (PROVENTIL VENTOLIN) nebulizer solution 2.5 mg  2.5 mg Nebulization Q6HWA RT    furosemide (LASIX) injection 80 mg  80 mg IntraVENous Q12H       ROS:    Constitutional:   Negative for fevers and unexplained weight loss. Eyes:   Negative for visual disturbance. ENT:   Negative for significant hearing loss and tinnitus. Respiratory:   Negative for hemoptysis. Cardiovascular:   Negative except as noted in HPI. Gastrointestinal:   Negative for melena and abdominal pain. Genitourinary:   Negative for hematuria, renal stones. Integumentary:   Negative for rash or non-healing wounds  Hematologic/Lymphatic:   Negative for excessive bleeding hx or clotting disorder. Musculoskeletal:  Negative for active, unexplained/severe joint pain. Neurological:   Negative for stroke. Behavioral/Psych:   Negative for suicidal ideations. Endocrine:   Negative for uncontrolled diabetic symptoms including polyuria, polydipsia and poor wound healing.      PHYSICAL EXAM:       Visit Vitals  /79   Pulse 74   Temp 98.5 °F (36.9 °C)   Resp 19   Ht 5' 11\" (1.803 m)   Wt 83.7 kg (184 lb 8.4 oz)   SpO2 91%   BMI 25.74 kg/m²      General/Constitutional:   Alert and oriented x 3, no acute distress  HEENT:   normocephalic, atraumatic, moist mucous membranes  Neck:   Npos JVD  Cardiovascular:   regular rate and rhythm, 3/6 SM  Pulmonary:   clear to auscultation bilaterally, no respiratory distress  Abdomen:   soft, non-tender, non-distended  Ext:   mod LE edema bilaterally  Skin:  warm and dry, no obvious rashes seen  Neuro:   no obvious sensory or motor deficits  Psychiatric:   normal mood and affect      Medical problems, medical history and test results were reviewed with the patient today. Labs:   Recent Labs     08/17/19  0725 08/16/19  1718 08/16/19  1329    137  --    K 3.8 4.5  --    BUN 30* 28*  --    CREA 1.47 1.50  --    GLU 91 104*  --    WBC 9.5  --  9.1   HGB 15.2  --  16.0   HCT 45.5  --  48.2   *  --  108*       Echo Results  (Last 48 hours)    None        CXR Results  (Last 48 hours)               08/16/19 1241  XR CHEST PA LAT Final result    Impression:  Impression:   No significant interval change. Narrative:  PA and lateral chest radiographs       History: f/u post bronchoscopy, 67 years Male       Comparison: Chest radiograph August 02, 2019       Findings: Cardiac pacing device obscures part of the left hemithorax, leads   appear to remain in anatomic position. Partially obscured cardiomediastinal   silhouette with evidence of CABG. Multiple broken sternal wires appear   unchanged. Persistent small left pleural effusion with associated left basilar   atelectasis and or consolidation. Probable mild interstitial edema unchanged. No evidence of pneumothorax. Visualized soft tissue and osseous structures   otherwise unremarkable. ASSESSMENT:    Diagnoses and all orders for this visit:    1. Acute on chronic congestive heart failure, unspecified heart failure type ([address]    2. Near syncope    3. A/C SHF    4. pAF    5. H/o ICD    6. CAD    7.  H/O Amio reaction      PLAN:     1. A/c SHF:  Agree with IV lasix, will check echo and follow. Review pleural fluid. Ask pulmonary to see. Follow AM labs. 2. Syncope: check device. Remain on meds, check echo and follow    3. PAF:  Remain on tikosyn, s/p watchman on ASA only now. Follow labs, follow K and Mg. 4. ICD:  Ask MDT to check device. 5. CAD:  Prior CABG, known ICM. Trop neg. Check echo, remain on meds, follow. 6. CKD: Follow with diuresis. Check AM labs. We will follow along, check echo and ICD. Follow labs. Thanks.          Rudy Barlow DO  8/17/2019   1:50pm

## 2019-08-17 NOTE — H&P
HOSPITALIST INITIAL HISTORY AND PHYSICAL    NAME:  Darrel Ramsay   Age:  67 y.o.  :   1940   MRN:   [id number]  PCP: Dustin Shore MD  Consulting MD:  Treatment Team: Attending Provider: Sissy Peterson DO    CHIEF COMPLAINT: SOB/ROBERTO    HISTORY OF PRESENT ILLNESS:   Franchesca Chaudhary is a 67 y.o. male with a past medical history of sCHF, CAD, atrial flutter who presents to the ER with complaint of SOB, lightheadness, and two episodes of syncope today. He reports that he was treated for CAP at [facility] about a month ago, and since that time has not felt back to his baseline. He reports worsening BLE edema and SOB for the past 2-3 days. Reports tht he still feels SOB whenever he moves. Denies any missed doses of his home Lasix. REVIEW OF SYSTEMS: Comprehensive ROS performed. Denies fevers, chills, nausea, vomiting, otherwise negative. Past Medical History:   Diagnosis Date    Acute diastolic CHF (congestive heart failure) ([address] 9/15/2015    Arrhythmia     afib, icd    Atypical atrial flutter ([address] 2016    Biventricular ICD (implantable cardioverter-defibrillator) in place 2004    CAD (coronary artery disease)     stents heart , cabg     Chest pain 9/15/2015    Chronic systolic CHF (congestive heart failure) ([address] 2016    Coronary atherosclerosis of native coronary vessel 9/15/2015    Dyspnea 9/15/2015    Essential hypertension, benign 9/15/2015    Heart failure ([address]     Hx of CABG 2004    Hyperlipidemia 9/15/2015    Hypertension     Hypothyroidism 6/3/2014    Ischemic cardiomyopathy 6/3/2014    Long-term use of high-risk medication 2016    Myocardial infarction acute ([address] 9/15/2015    Other and unspecified angina pectoris 9/15/2015    Palpitations 9/15/2015    Paroxysmal ventricular tachycardia ([address] 9/15/2015    Persistent atrial fibrillation ([address] 2016    1. Attempt A. Fib ablation at [address] -  2.  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 Taking? ALPRAZolam (XANAX) 0.5 mg tablet   Yes No   Sig: Take 0.5 mg by mouth nightly as needed. OTHER   Yes No   Sig: daily. Tumeric daily   aspirin 81 mg chewable tablet   Yes No   Sig: Take 81 mg by mouth daily. carvedilol (COREG) 12.5 mg tablet   No No   Sig: Take 1 Tab by mouth two (2) times daily (with meals). dofetilide (TIKOSYN) 250 mcg capsule   No No   Sig: Take 1 Cap by mouth two (2) times a day. finasteride (PROSCAR) 5 mg tablet   Yes No   Sig: Take 5 mg by mouth daily. fluticasone furoate-vilanterol (BREO ELLIPTA) 100-25 mcg/dose inhaler   No No   Sig: Take 1 Puff by inhalation daily. furosemide (LASIX) 40 mg tablet   No No   Sig: Take 1 Tab by mouth as needed (for weight gain 3 lbs overnight/ 5 lbs in week). If light headedness occurs, take 0.5 tablet daily. levothyroxine (SYNTHROID) 125 mcg tablet   Yes No   Sig: Take 125 mcg by mouth Daily (before breakfast). multivitamin (ONE A DAY) tablet   Yes No   Sig: Take 1 Tab by mouth daily. omega-3 fatty acids-vitamin e 2,000-650-12 mg/2.5 gram elpk   Yes No   Sig: Take 2,000 mg by mouth daily. spironolactone (ALDACTONE) 25 mg tablet   No No   Sig: Take 1 Tab by mouth daily. Facility-Administered Medications: None       Allergies   Allergen Reactions    Pradaxa [Dabigatran Etexilate] Not Reported This Time and Anaphylaxis    Shellfish Derived Nausea and Vomiting       FAMILY HISTORY: Reviewed.  Negative except   Family History   Problem Relation Age of Onset    Heart Attack Other     Heart Failure Other         Congestive       Social History     Tobacco Use    Smoking status: Former Smoker     Last attempt to quit: 2003     Years since quittin.6    Smokeless tobacco: Never Used    Tobacco comment: pipe quit 10 years ago   Substance Use Topics    Alcohol use: No         Objective:     Visit Vitals  /88 (BP 1 Location: Left arm, BP Patient Position: At rest)   Pulse 75   Temp 98 °F (36.7 °C)   Resp 20   Ht 5' 11\" (1.803 m)   Wt 85.4 kg (188 lb 3.2 oz)   SpO2 96%   BMI 26.25 kg/m²      Temp (24hrs), Av.7 °F (36.5 °C), Min:97.3 °F (36.3 °C), Max:98 °F (36.7 °C)    Oxygen Therapy  O2 Sat (%): 96 % (19)  Pulse via Oximetry: 66 beats per minute (19)  O2 Device: Room air (19)  Physical Exam:  General:    The patient is a very pleasant elderly male in no acute distress. Head:   Normocephalic/atraumatic. Eyes:  No palpebral pallor or scleral icterus. ENT:  External auricular and nasal exam within normal limits. Mucous membranes are moist.  Neck:  Supple, non-tender, no JVD. Lungs:    CTAB    No respiratory distress or accessory muscle use. Heart:   Regular rate and rhythm, without murmurs, rubs, or gallops. Abdomen:   Soft, non-tender, non-distended with normoactive bowel sounds. Genitourinary: No tenderness over the bladder or bilateral CVAs. Extremities: Without clubbing, cyanosis. 1+ BLE pitting edema to knees  Skin:     Normal color, texture, and turgor. No rashes, lesions, or jaundice. Pulses: Radial and dorsalis pedis pulses present 2+ bilaterally. Capillary refill <2s. Neurologic: CN II-XII grossly intact and symmetrical.     Moving all four extremities well with no focal deficits. Psychiatric: Pleasant demeanor, appropriate affect.  Alert and oriented x 3    Data Review:   Recent Results (from the past 24 hour(s))   CBC WITH AUTOMATED DIFF    Collection Time: 19  1:29 PM   Result Value Ref Range    WBC 9.1 4.3 - 11.1 K/uL    RBC 4.93 4.23 - 5.6 M/uL    HGB 16.0 13.6 - 17.2 g/dL    HCT 48.2 41.1 - 50.3 %    MCV 97.8 79.6 - 97.8 FL    MCH 32.5 26.1 - 32.9 PG    MCHC 33.2 31.4 - 35.0 g/dL    RDW 17.5 (H) 11.9 - 14.6 %    PLATELET 766 (L) 069 - 450 K/uL    MPV 12.1 9.4 - 12.3 FL    ABSOLUTE NRBC 0.00 0.0 - 0.2 K/uL    DF AUTOMATED      NEUTROPHILS 61 43 - 78 %    LYMPHOCYTES 29 13 - 44 %    MONOCYTES 8 4.0 - 12.0 %    EOSINOPHILS 1 0.5 - 7.8 %    BASOPHILS 1 0.0 - 2.0 %    IMMATURE GRANULOCYTES 1 0.0 - 5.0 %    ABS. NEUTROPHILS 5.6 1.7 - 8.2 K/UL    ABS. LYMPHOCYTES 2.7 0.5 - 4.6 K/UL    ABS. MONOCYTES 0.7 0.1 - 1.3 K/UL    ABS. EOSINOPHILS 0.1 0.0 - 0.8 K/UL    ABS. BASOPHILS 0.1 0.0 - 0.2 K/UL    ABS. IMM. GRANS. 0.1 0.0 - 0.5 K/UL   BNP    Collection Time: 08/16/19  1:29 PM   Result Value Ref Range    BNP 1,164 (H) 0 pg/mL   METABOLIC PANEL, COMPREHENSIVE    Collection Time: 08/16/19  5:18 PM   Result Value Ref Range    Sodium 137 136 - 145 mmol/L    Potassium 4.5 3.5 - 5.1 mmol/L    Chloride 103 98 - 107 mmol/L    CO2 25 21 - 32 mmol/L    Anion gap 9 7 - 16 mmol/L    Glucose 104 (H) 65 - 100 mg/dL    BUN 28 (H) 8 - 23 MG/DL    Creatinine 1.50 0.8 - 1.5 MG/DL    GFR est AA 58 (L) >60 ml/min/1.73m2    GFR est non-AA 48 (L) >60 ml/min/1.73m2    Calcium 10.1 8.3 - 10.4 MG/DL    Bilirubin, total 1.2 (H) 0.2 - 1.1 MG/DL    ALT (SGPT) 27 12 - 65 U/L    AST (SGOT) 34 15 - 37 U/L    Alk.  phosphatase 121 50 - 136 U/L    Protein, total 7.9 6.3 - 8.2 g/dL    Albumin 3.8 3.2 - 4.6 g/dL    Globulin 4.1 (H) 2.3 - 3.5 g/dL    A-G Ratio 0.9 (L) 1.2 - 3.5     TROPONIN I    Collection Time: 08/16/19  5:18 PM   Result Value Ref Range    Troponin-I, Qt. 0.02 0.02 - 0.05 NG/ML   POC G3    Collection Time: 08/16/19  5:34 PM   Result Value Ref Range    Device: ROOM AIR      FIO2 (POC) 21 %    pH (POC) 7.429 7.35 - 7.45      pCO2 (POC) 30.7 (L) 35 - 45 MMHG    pO2 (POC) 75 75 - 100 MMHG    HCO3 (POC) 20.3 (L) 22 - 26 MMOL/L    sO2 (POC) 95 95 - 98 % Base deficit (POC) 3 mmol/L    Allens test (POC) YES      Site LEFT RADIAL      Patient temp. 98.6      Specimen type (POC) ARTERIAL      Performed by Kateryna     CO2, POC 21 MMOL/L    Critical value read back 00:01     COLLECT TIME 1,732     EKG, 12 LEAD, INITIAL    Collection Time: 08/16/19  5:49 PM   Result Value Ref Range    Ventricular Rate 99 BPM    Atrial Rate 53 BPM    QRS Duration 180 ms    Q-T Interval 304 ms    QTC Calculation (Bezet) 390 ms    Calculated R Axis -174 degrees    Calculated T Axis -20 degrees    Diagnosis       !! AGE AND GENDER SPECIFIC ECG ANALYSIS !! Electronic ventricular pacemaker  When compared with ECG of 02-AUG-2019 10:30,  Vent. rate has decreased BY  12 BPM         Imaging Adriana Braun /Studies:  Xr Chest Pa Lat    Result Date: 8/16/2019  Impression:   No significant interval change. Assessment and Plan:     Principal Problem:    Acute systolic CHF (congestive heart failure) ([address] (9/15/2015)    Last TTE on 1/19 showed EF 20-25%. Will repeat TTE, consult cardiology. IV Lasix 80 BID for now. Active Problems:    SOB (shortness of breath) (9/15/2015)    Per above. CAD (coronary artery disease) (6/3/2014)    Stable. Continue home meds. Essential hypertension, benign (9/15/2015)    Stable. Continue home meds. Paroxysmal ventricular tachycardia ([address] (9/15/2015)    Stable. Continue home meds. Coronary atherosclerosis of native coronary vessel (9/15/2015)    Stable. Continue home meds. Atypical atrial flutter ([address] (4/14/2016)    Stable. Continue home meds. Chronic systolic CHF (congestive heart failure) ([address] (4/28/2016)    Per above. Persistent atrial fibrillation ([address] (5/6/2016)    Stable. Continue home meds. Hypertension (11/17/2014)    Stable. Continue home meds. Hypothyroidism (6/3/2014)    Stable. Continue home meds. Hyperlipidemia (9/15/2015)    Stable. Continue home meds.       DVT Prophylaxis: Heparin      Code Status: FULL CODE      Disposition: Admit to remote tele for evaluation and treatment as per above.       Anticipated discharge: 2-3 days     Signed By: Magda Stokes MD     August 16, 2019

## 2019-08-17 NOTE — PROGRESS NOTES
Pt sitting up in the bed. Pt states that it is easier to breathe while sitting upright. Respirations are even and unlabored on room air. Pt denies SOB, pain or discomfort while at rest. Pt states that he gets short of breath when up ambulating. Bed is in low and locked position. Call light is within reach. Pt instructed to ask for assistance if needed. Will continue to monitor.

## 2019-08-17 NOTE — PROGRESS NOTES
Hospitalist Progress Note    Patient: Noel Gould MRN: [id number]  SSN: [id number]    YOB: 1940  Age: 67 y.o. Sex: male      Admit Date: 8/16/2019    LOS: 1 day     Subjective:     67year old CM with a PMH of sCHF, CAD, atrial flutter admitted for acute sCHF exacerbation with complaints of SOB, lightheadness, and two episodes of syncope. He recently had pneumonia at [facility] and hasn't felt better since discharge. 8/17 - He feels better today. Still with LE edema. SOB improved. Denies CP. Review of systems negative except stated above. Objective:     Visit Vitals  /79   Pulse 74   Temp 98.5 °F (36.9 °C)   Resp 19   Ht 5' 11\" (1.803 m)   Wt 83.7 kg (184 lb 8.4 oz)   SpO2 91%   BMI 25.74 kg/m²      Oxygen Therapy  O2 Sat (%): 91 % (08/17/19 1114)  Pulse via Oximetry: 66 beats per minute (08/17/19 0842)  O2 Device: Room air (08/17/19 0842)      Intake and Output:     Intake/Output Summary (Last 24 hours) at 8/17/2019 1444  Last data filed at 8/17/2019 1341  Gross per 24 hour   Intake 1140 ml   Output 2300 ml   Net -1160 ml         Physical Exam:   GENERAL: alert, cooperative, no distress, appears stated age  EYE: conjunctivae/corneas clear. PERRL. THROAT & NECK: normal and no erythema or exudates noted. LUNG: clear to auscultation bilaterally  HEART: regular rate and rhythm, S1S2, no murmur, no JVD  ABDOMEN: soft, non-tender, non-distended. Bowel sounds normal.   EXTREMITIES:  1+ BLE edema, 2+ pedal/radial pulses bilaterally  SKIN: no rash or abnormalities  NEUROLOGIC: A&Ox3. Cranial nerves 2-12 grossly intact.     Lab/Data Review:  Recent Results (from the past 24 hour(s))   METABOLIC PANEL, COMPREHENSIVE    Collection Time: 08/16/19  5:18 PM   Result Value Ref Range    Sodium 137 136 - 145 mmol/L    Potassium 4.5 3.5 - 5.1 mmol/L    Chloride 103 98 - 107 mmol/L    CO2 25 21 - 32 mmol/L    Anion gap 9 7 - 16 mmol/L    Glucose 104 (H) 65 - 100 mg/dL    BUN 28 (H) 8 - 23 MG/DL    Creatinine 1.50 0.8 - 1.5 MG/DL    GFR est AA 58 (L) >60 ml/min/1.73m2    GFR est non-AA 48 (L) >60 ml/min/1.73m2    Calcium 10.1 8.3 - 10.4 MG/DL    Bilirubin, total 1.2 (H) 0.2 - 1.1 MG/DL    ALT (SGPT) 27 12 - 65 U/L    AST (SGOT) 34 15 - 37 U/L    Alk. phosphatase 121 50 - 136 U/L    Protein, total 7.9 6.3 - 8.2 g/dL    Albumin 3.8 3.2 - 4.6 g/dL    Globulin 4.1 (H) 2.3 - 3.5 g/dL    A-G Ratio 0.9 (L) 1.2 - 3.5     TROPONIN I    Collection Time: 08/16/19  5:18 PM   Result Value Ref Range    Troponin-I, Qt. 0.02 0.02 - 0.05 NG/ML   POC G3    Collection Time: 08/16/19  5:34 PM   Result Value Ref Range    Device: ROOM AIR      FIO2 (POC) 21 %    pH (POC) 7.429 7.35 - 7.45      pCO2 (POC) 30.7 (L) 35 - 45 MMHG    pO2 (POC) 75 75 - 100 MMHG    HCO3 (POC) 20.3 (L) 22 - 26 MMOL/L    sO2 (POC) 95 95 - 98 %    Base deficit (POC) 3 mmol/L    Allens test (POC) YES      Site LEFT RADIAL      Patient temp. 98.6      Specimen type (POC) ARTERIAL      Performed by Kateryna     CO2, POC 21 MMOL/L    Critical value read back 00:01     COLLECT TIME 1,732     EKG, 12 LEAD, INITIAL    Collection Time: 08/16/19  5:49 PM   Result Value Ref Range    Ventricular Rate 99 BPM    Atrial Rate 53 BPM    QRS Duration 180 ms    Q-T Interval 304 ms    QTC Calculation (Bezet) 390 ms    Calculated R Axis -174 degrees    Calculated T Axis -20 degrees    Diagnosis       !! AGE AND GENDER SPECIFIC ECG ANALYSIS !! Electronic ventricular pacemaker  When compared with ECG of 02-AUG-2019 10:30,  Vent.  rate has decreased BY  12 BPM  Confirmed by Rosa Urias ([id number]) on 8/17/2019 6:50:51 AM     CBC WITH AUTOMATED DIFF    Collection Time: 08/17/19  7:25 AM   Result Value Ref Range    WBC 9.5 4.3 - 11.1 K/uL    RBC 4.70 4.23 - 5.6 M/uL    HGB 15.2 13.6 - 17.2 g/dL    HCT 45.5 41.1 - 50.3 %    MCV 96.8 79.6 - 97.8 FL    MCH 32.3 26.1 - 32.9 PG    MCHC 33.4 31.4 - 35.0 g/dL    RDW 17.2 (H) 11.9 - 14.6 %    PLATELET 715 (L) 011 - 450 K/uL    MPV 12.0 9.4 - 12.3 FL    ABSOLUTE NRBC 0.00 0.0 - 0.2 K/uL    DF AUTOMATED      NEUTROPHILS 57 43 - 78 %    LYMPHOCYTES 32 13 - 44 %    MONOCYTES 9 4.0 - 12.0 %    EOSINOPHILS 1 0.5 - 7.8 %    BASOPHILS 1 0.0 - 2.0 %    IMMATURE GRANULOCYTES 0 0.0 - 5.0 %    ABS. NEUTROPHILS 5.4 1.7 - 8.2 K/UL    ABS. LYMPHOCYTES 3.0 0.5 - 4.6 K/UL    ABS. MONOCYTES 0.9 0.1 - 1.3 K/UL    ABS. EOSINOPHILS 0.1 0.0 - 0.8 K/UL    ABS. BASOPHILS 0.1 0.0 - 0.2 K/UL    ABS. IMM. GRANS. 0.0 0.0 - 0.5 K/UL   METABOLIC PANEL, COMPREHENSIVE    Collection Time: 08/17/19  7:25 AM   Result Value Ref Range    Sodium 138 136 - 145 mmol/L    Potassium 3.8 3.5 - 5.1 mmol/L    Chloride 102 98 - 107 mmol/L    CO2 25 21 - 32 mmol/L    Anion gap 11 7 - 16 mmol/L    Glucose 91 65 - 100 mg/dL    BUN 30 (H) 8 - 23 MG/DL    Creatinine 1.47 0.8 - 1.5 MG/DL    GFR est AA 60 (L) >60 ml/min/1.73m2    GFR est non-AA 49 (L) >60 ml/min/1.73m2    Calcium 9.3 8.3 - 10.4 MG/DL    Bilirubin, total 1.0 0.2 - 1.1 MG/DL    ALT (SGPT) 20 12 - 65 U/L    AST (SGOT) 22 15 - 37 U/L    Alk. phosphatase 116 50 - 136 U/L    Protein, total 7.3 6.3 - 8.2 g/dL    Albumin 3.4 3.2 - 4.6 g/dL    Globulin 3.9 (H) 2.3 - 3.5 g/dL    A-G Ratio 0.9 (L) 1.2 - 3.5         Imaging:  Xr Chest Pa Lat    Result Date: 8/16/2019  PA and lateral chest radiographs History: f/u post bronchoscopy, 67 years Male Comparison: Chest radiograph August 02, 2019 Findings: Cardiac pacing device obscures part of the left hemithorax, leads appear to remain in anatomic position. Partially obscured cardiomediastinal silhouette with evidence of CABG. Multiple broken sternal wires appear unchanged. Persistent small left pleural effusion with associated left basilar atelectasis and or consolidation. Probable mild interstitial edema unchanged. No evidence of pneumothorax. Visualized soft tissue and osseous structures otherwise unremarkable. Impression:   No significant interval change.     Xr Chest Pa Lat    Result Date: 8/2/2019  AP LATERAL CHEST  8/2/2019 10:42 AM HISTORY:  Shortness of breath COMPARISON: Kaylene 3, 2014 FINDINGS: The cardiac silhouette is prominent. Median sternotomy wires are present. A cardiac fibular device is present. Left hemidiaphragm is elevated. There is no lobar consolidation. IMPRESSION: Elevated left hemidiaphragm. No lobar consolidation. Ct Chest Wo Cont    Result Date: 8/2/2019  CT CHEST WITHOUT CONTRAST 8/2/2019 HISTORY: Congestive heart failure. Lower extremity edema. Recent treatment for pneumonia. TECHNIQUE: Noncontrast axial images were obtained through the chest. Coronal reformatted images were generated. All CT scans at this facility used dose modulation, iterative reconstruction and/or weight based dosing when appropriate to reduce radiation dose to as low as reasonably achievable. COMPARISON: AP lateral chest x-ray from the same day FINDINGS: A cardiac defibrillator device is present. Median sternotomy wires are present. An intracardiac device is present within the left atrial appendage. There is a small to moderate left pleural effusion with atelectasis in the left lung base. Reticulonodular interstitial densities are present in the periphery of the lungs. This may be caused by interstitial scarring or edema. The central airways are patent. The heart size is enlarged. Scattered mediastinal lymph nodes are present including a precarinal node that measures 1.5 cm short axis. This finding may be present in the setting of heart failure. Included images of the upper abdomen demonstrate mild diffuse left adrenal enlargement. A few calcifications are present in the periphery of the spleen. There are no aggressive osseous lesions. IMPRESSION: Small-to-moderate left pleural effusion with atelectasis in the left lung base. No results found for this visit on 08/16/19. Cultures:   All Micro Results     None          Assessment/Plan:     Principal Problem: Acute on chronic systolic congestive heart failure ([facility] (9/15/2015)  - Last EF 15%  - Repeat ECHO  - Continue IV Lasix  - Continue Lisinopril  - Continue Spironolactone  - Continue Coreg  - Appreciate Cardiology's input and assistance    Active Problems:    SOB (shortness of breath) (9/15/2015)  - Likely due to sCHF  - Continue IV Lasix  - Monitor closely      Syncope (8/17/2019)  - ICD to be checked  - Continue remote telemetry      Pleural effusion (8/2/2019)  - Appears stable  - Pulmonary consulted      Atypical atrial flutter ([facility] (4/14/2016)  - No acute issues  - Continue Tikosyn  - Continue Coreg  - S/P Watchman device --> continue ASA      Persistent atrial fibrillation ([facility] (5/6/2016)  - No acute issues  - Continue Tikosyn  - Continue Coreg  - S/P Watchman device --> continue ASA      Hypertension (11/17/2014)  - Stable  - Continue Lisinopril  - Continue Spironolactone  - Continue Coreg      Coronary atherosclerosis of native coronary vessel (9/15/2015)  - No acute CP  - Continue ASA  - Continue Lisinopril      Chronic kidney disease (10/10/2014)  - Stable      Hypothyroidism (6/3/2014)  - Continue Levothyroxine      Hyperlipidemia (9/15/2015)    Dispo - Continue IV Lasix. Await ECHO. Pulmonary consulted.     DIET CARDIAC Regular; 2 GM NA (House Low NA)    DVT Prophylaxis: Heparin      Signed By: Alona Thompson DO     August 17, 2019

## 2019-08-18 LAB
ALBUMIN SERPL-MCNC: 3.5 G/DL (ref 3.2–4.6)
ALBUMIN/GLOB SERPL: 1 {RATIO} (ref 1.2–3.5)
ALP SERPL-CCNC: 113 U/L (ref 50–136)
ALT SERPL-CCNC: 23 U/L (ref 12–65)
ANION GAP SERPL CALC-SCNC: 11 MMOL/L (ref 7–16)
AST SERPL-CCNC: 28 U/L (ref 15–37)
BASOPHILS # BLD: 0.1 K/UL (ref 0–0.2)
BASOPHILS NFR BLD: 1 % (ref 0–2)
BILIRUB SERPL-MCNC: 1.1 MG/DL (ref 0.2–1.1)
BNP SERPL-MCNC: 1595 PG/ML
BUN SERPL-MCNC: 25 MG/DL (ref 8–23)
CALCIUM SERPL-MCNC: 9.1 MG/DL (ref 8.3–10.4)
CHLORIDE SERPL-SCNC: 102 MMOL/L (ref 98–107)
CO2 SERPL-SCNC: 23 MMOL/L (ref 21–32)
CREAT SERPL-MCNC: 1.51 MG/DL (ref 0.8–1.5)
DIFFERENTIAL METHOD BLD: ABNORMAL
EOSINOPHIL # BLD: 0.1 K/UL (ref 0–0.8)
EOSINOPHIL NFR BLD: 1 % (ref 0.5–7.8)
ERYTHROCYTE [DISTWIDTH] IN BLOOD BY AUTOMATED COUNT: 17.1 % (ref 11.9–14.6)
GLOBULIN SER CALC-MCNC: 3.4 G/DL (ref 2.3–3.5)
GLUCOSE SERPL-MCNC: 243 MG/DL (ref 65–100)
HCT VFR BLD AUTO: 46.2 % (ref 41.1–50.3)
HGB BLD-MCNC: 15.2 G/DL (ref 13.6–17.2)
IMM GRANULOCYTES # BLD AUTO: 0 K/UL (ref 0–0.5)
IMM GRANULOCYTES NFR BLD AUTO: 1 % (ref 0–5)
LYMPHOCYTES # BLD: 2 K/UL (ref 0.5–4.6)
LYMPHOCYTES NFR BLD: 26 % (ref 13–44)
MAGNESIUM SERPL-MCNC: 2.4 MG/DL (ref 1.8–2.4)
MCH RBC QN AUTO: 32 PG (ref 26.1–32.9)
MCHC RBC AUTO-ENTMCNC: 32.9 G/DL (ref 31.4–35)
MCV RBC AUTO: 97.3 FL (ref 79.6–97.8)
MONOCYTES # BLD: 0.6 K/UL (ref 0.1–1.3)
MONOCYTES NFR BLD: 8 % (ref 4–12)
NEUTS SEG # BLD: 5.1 K/UL (ref 1.7–8.2)
NEUTS SEG NFR BLD: 65 % (ref 43–78)
NRBC # BLD: 0 K/UL (ref 0–0.2)
PLATELET # BLD AUTO: 113 K/UL (ref 150–450)
PMV BLD AUTO: 12.1 FL (ref 9.4–12.3)
POTASSIUM SERPL-SCNC: 3.9 MMOL/L (ref 3.5–5.1)
PROT SERPL-MCNC: 6.9 G/DL (ref 6.3–8.2)
RBC # BLD AUTO: 4.75 M/UL (ref 4.23–5.6)
SODIUM SERPL-SCNC: 136 MMOL/L (ref 136–145)
TSH SERPL DL<=0.005 MIU/L-ACNC: 1.47 UIU/ML (ref 0.36–3.74)
WBC # BLD AUTO: 7.9 K/UL (ref 4.3–11.1)

## 2019-08-18 PROCEDURE — 84443 ASSAY THYROID STIM HORMONE: CPT

## 2019-08-18 PROCEDURE — 99222 1ST HOSP IP/OBS MODERATE 55: CPT | Performed by: INTERNAL MEDICINE

## 2019-08-18 PROCEDURE — 83880 ASSAY OF NATRIURETIC PEPTIDE: CPT

## 2019-08-18 PROCEDURE — 36415 COLL VENOUS BLD VENIPUNCTURE: CPT

## 2019-08-18 PROCEDURE — 83735 ASSAY OF MAGNESIUM: CPT

## 2019-08-18 PROCEDURE — 76450000000

## 2019-08-18 PROCEDURE — 74011000250 HC RX REV CODE- 250: Performed by: FAMILY MEDICINE

## 2019-08-18 PROCEDURE — 80053 COMPREHEN METABOLIC PANEL: CPT

## 2019-08-18 PROCEDURE — 85025 COMPLETE CBC W/AUTO DIFF WBC: CPT

## 2019-08-18 PROCEDURE — 74011250637 HC RX REV CODE- 250/637: Performed by: FAMILY MEDICINE

## 2019-08-18 PROCEDURE — 74011250636 HC RX REV CODE- 250/636: Performed by: FAMILY MEDICINE

## 2019-08-18 PROCEDURE — 65660000000 HC RM CCU STEPDOWN

## 2019-08-18 RX ADMIN — SPIRONOLACTONE 25 MG: 25 TABLET ORAL at 08:44

## 2019-08-18 RX ADMIN — DOFETILIDE 250 MCG: 0.25 CAPSULE ORAL at 21:21

## 2019-08-18 RX ADMIN — FUROSEMIDE 80 MG: 10 INJECTION, SOLUTION INTRAMUSCULAR; INTRAVENOUS at 08:44

## 2019-08-18 RX ADMIN — CARVEDILOL 12.5 MG: 12.5 TABLET, FILM COATED ORAL at 17:23

## 2019-08-18 RX ADMIN — DOFETILIDE 250 MCG: 0.25 CAPSULE ORAL at 09:11

## 2019-08-18 RX ADMIN — FINASTERIDE 5 MG: 5 TABLET, FILM COATED ORAL at 08:45

## 2019-08-18 RX ADMIN — ALPRAZOLAM 0.5 MG: 0.5 TABLET ORAL at 21:24

## 2019-08-18 RX ADMIN — HEPARIN SODIUM 5000 UNITS: 5000 INJECTION INTRAVENOUS; SUBCUTANEOUS at 20:48

## 2019-08-18 RX ADMIN — CARVEDILOL 12.5 MG: 12.5 TABLET, FILM COATED ORAL at 08:45

## 2019-08-18 RX ADMIN — HEPARIN SODIUM 5000 UNITS: 5000 INJECTION INTRAVENOUS; SUBCUTANEOUS at 05:05

## 2019-08-18 RX ADMIN — HEPARIN SODIUM 5000 UNITS: 5000 INJECTION INTRAVENOUS; SUBCUTANEOUS at 14:36

## 2019-08-18 RX ADMIN — LEVOTHYROXINE SODIUM 125 MCG: 125 TABLET ORAL at 05:10

## 2019-08-18 RX ADMIN — ASPIRIN 81 MG 81 MG: 81 TABLET ORAL at 08:45

## 2019-08-18 RX ADMIN — FUROSEMIDE 80 MG: 10 INJECTION, SOLUTION INTRAMUSCULAR; INTRAVENOUS at 20:49

## 2019-08-18 NOTE — PROGRESS NOTES
[facility] CARDIOLOGY PROGRESS NOTE           8/18/2019 10:31 AM    Admit Date: 8/16/2019    Subjective:   Feeling weak. He has struggled since PNA several weeks ago, more ROBERTO, weakness. EF worsening on echo. Device check showing BiV pacing 98%, no VT/VF/Afib. ROS:  GEN:  No fever or chills  Cardiovascular:  As noted above  Pulmonary:  As noted above  Neuro:  No new focal motor or sensory loss    Objective:      Vitals:    08/17/19 2151 08/18/19 0214 08/18/19 0318 08/18/19 0728   BP: 120/76  118/78 119/85   Pulse: 63  61 76   Resp: 16 17 19   Temp: 98.1 °F (36.7 °C)  98.4 °F (36.9 °C) 98.4 °F (36.9 °C)   SpO2: 94%  98% 96%   Weight:  86 kg (189 lb 9.5 oz)     Height:           Physical Exam:  General-A and O x 3  Neck- supple, no JVD  CV- regular rate and rhythm, 2/6 SM  Lung- clear bilaterally  Abd- soft, nontender, nondistended  Ext- no edema bilaterally. Skin- warm and dry  Psychiatric:  Normal mood and affect. Neurologic:  Alert and oriented X 3      Data Review:   Recent Labs     08/18/19  0723 08/17/19  0725 08/16/19  1718 08/16/19  1329   NA  --  138 137  --    K  --  3.8 4.5  --    MG 2.4  --   --   --    BUN  --  30* 28*  --    CREA  --  1.47 1.50  --    GLU  --  91 104*  --    WBC  --  9.5  --  9.1   HGB  --  15.2  --  16.0   HCT  --  45.5  --  48.2   PLT  --  117*  --  108*   TROIQ  --   --  0.02  --        TELEMETRY:  Sinus, paced    Assessment/Plan:     Principal Problem:    Acute on chronic systolic congestive heart failure (HCC) (9/15/2015)  Echo showing severe, worsening BiV failure, EF ~10% with RV failure as well. Layered thrombus seen on echo (seems chronic by echo findings and he does not want [facility], he had reactions and hematuria in the past on all the NOACs, riskof CVA reviewed). Plan for med mgmt, he expressed desire for palliattive care, would ask them to see him tomorrow. IV lasix for one more day. Prognosis poor based on echo. On coreg and Ace. Active Problems:    CAD (coronary artery disease) (6/3/2014)  As above, med mgmt for now. Remain on ASA and BB. Trop neg. Essential hypertension, benign (9/15/2015)  Remain on meds      Coronary atherosclerosis of native coronary vessel (9/15/2015)  As above, 4v CABG 2004. Chronic kidney disease (10/10/2014)  Follow with IV diuresis. PAF:  Remain on tikosyn, s/p watchman. Pleural effusion (8/2/2019)  Pulm seeing      Syncope (8/17/2019)  Device ok, follow for now. No EPS needed. Biventricular ICD:  No events on device check as reviewed. LV Thrombus: see above. No [facility] for now. Dispo: recommend more IV lasix for another 24 hours, as palliative care to see in the AM.    We will follow, thanks.         Sebastian Lobo DO  8/18/2019 10:31 AM

## 2019-08-18 NOTE — PROCEDURES
PROCEDURE:  DIAGNOSTIC THORACENTESIS, THERAPEUTIC THORACENTESIS       PRE-OP DIAGNOSIS:  L PLEURAL EFFUSION    POST-OP DIAGNOSIS:  L PLEURAL EFFUSION    VOLUME REMOVED:    500cc    ANESTHESIA:    LOCAL ANESTHESIA WITH 1% LIDOCAINE 10 CC TOTAL. CHEST ULTRASOUND FINDINGS:    A Turbo-M, Sonosite ultrasound with a 5-16 mHz probe was used to image the chest and localize the pleural effusion on the left chest.    A moderate anechoic space was seen on the left consistent with an uncomplicated pleural effusion. DESCRIPTION OF PROCEDURE:    After obtaining informed consent and localizing the safest location for thoracentesis, the  8th intercostal space was marked with a blunt, plastic needle cap in the mid scapular line. An AppTap AK-0100 Pleral-Seal thoracentesis kit was used to perform the procedure. The skin was cleansed with the supplied chlorhexidine swab and then draped in the usual fashion. Using the previously marked location as a guide, a 22 G 1.5 inch needle was used to inject 1% lidocaine into the skin and subcutaneous tissue, as well as onto the underlying rib and inter-costal muscles. Pleural fluid was aspirated to assure proper location and additional lidocaine was injected into the pleural space prior to removing the anesthesia needle. A 3mm incision was then made with the supplied scalpel in the usual fashion to facilitate the insertion of the thoracentesis needle. The needle with an 8 [place] thoracentesis catheter was then introduced into the chest through the previously made incision in the usual fashion, the rib localized with the needle, and the catheter then marched over the rib into the pleural space. After aspirating fluid, the thoracentesis catheter was then placed into the chest using the needle itself as a trocar. The needle was then removed and the catheter was attached to the supplied tubing without complication.     500 cc of troy fluid was aspirated and sent for analysis. Fluid was sent for the following tests:      LDH  Total Protein  Glucose  Cell count with differential  Routine culture and Gram stain  Cytology  AFB  Fungus    Post procedure US confirmed complete drainage of the effusion and presence of lung sliding, ruling out pneumothorax.  ([id number])    EBL:     <6HC    COMPLICATIONS:    none      Patricia Resendiz MD

## 2019-08-18 NOTE — PROGRESS NOTES
BSR received  Patient is resting in bed with no signs of distress at this time  Will continue to monitor

## 2019-08-18 NOTE — PROGRESS NOTES
Hospitalist Progress Note    Patient: Silvino Caba MRN: [id number]  SSN: [id number]    YOB: 1940  Age: 67 y.o. Sex: male      Admit Date: 8/16/2019    LOS: 2 days     Subjective:     67year old CM with a PMH of sCHF, CAD, atrial flutter admitted for acute sCHF exacerbation with complaints of SOB, lightheadness, and two episodes of syncope. He recently had pneumonia at [facility] and hasn't felt better since discharge. 8/18 - LE edema improved. SOB improved. Denies CP. Requesting palliative/hospice discussion. Review of systems negative except stated above. Objective:     Visit Vitals  /64   Pulse 91   Temp 98.5 °F (36.9 °C)   Resp 19   Ht 5' 11\" (1.803 m)   Wt 86 kg (189 lb 9.5 oz)   SpO2 95%   BMI 26.44 kg/m²      Oxygen Therapy  O2 Sat (%): 95 % (08/18/19 1058)  Pulse via Oximetry: 66 beats per minute (08/17/19 0842)  O2 Device: Room air (08/18/19 0836)      Intake and Output:     Intake/Output Summary (Last 24 hours) at 8/18/2019 1242  Last data filed at 8/18/2019 0912  Gross per 24 hour   Intake 720 ml   Output 1000 ml   Net -280 ml         Physical Exam:   GENERAL: alert, cooperative, no distress, appears stated age  EYE: conjunctivae/corneas clear. PERRL. THROAT & NECK: normal and no erythema or exudates noted. LUNG: clear to auscultation bilaterally  HEART: regular rate and rhythm, S1S2, no murmur, no JVD  ABDOMEN: soft, non-tender, non-distended. Bowel sounds normal.   EXTREMITIES:  1+ BLE edema, 2+ pedal/radial pulses bilaterally  SKIN: no rash or abnormalities  NEUROLOGIC: A&Ox3. Cranial nerves 2-12 grossly intact.     Lab/Data Review:  Recent Results (from the past 24 hour(s))   MAGNESIUM    Collection Time: 08/18/19  7:23 AM   Result Value Ref Range    Magnesium 2.4 1.8 - 2.4 mg/dL   BNP    Collection Time: 08/18/19  7:23 AM   Result Value Ref Range    BNP 1,595 (H) 0 pg/mL   TSH 3RD GENERATION    Collection Time: 08/18/19  7:23 AM   Result Value Ref Range    TSH 1.470 0.358 - 3.740 uIU/mL   CBC WITH AUTOMATED DIFF    Collection Time: 08/18/19 11:22 AM   Result Value Ref Range    WBC 7.9 4.3 - 11.1 K/uL    RBC 4.75 4.23 - 5.6 M/uL    HGB 15.2 13.6 - 17.2 g/dL    HCT 46.2 41.1 - 50.3 %    MCV 97.3 79.6 - 97.8 FL    MCH 32.0 26.1 - 32.9 PG    MCHC 32.9 31.4 - 35.0 g/dL    RDW 17.1 (H) 11.9 - 14.6 %    PLATELET 987 (L) 330 - 450 K/uL    MPV 12.1 9.4 - 12.3 FL    ABSOLUTE NRBC 0.00 0.0 - 0.2 K/uL    DF AUTOMATED      NEUTROPHILS 65 43 - 78 %    LYMPHOCYTES 26 13 - 44 %    MONOCYTES 8 4.0 - 12.0 %    EOSINOPHILS 1 0.5 - 7.8 %    BASOPHILS 1 0.0 - 2.0 %    IMMATURE GRANULOCYTES 1 0.0 - 5.0 %    ABS. NEUTROPHILS 5.1 1.7 - 8.2 K/UL    ABS. LYMPHOCYTES 2.0 0.5 - 4.6 K/UL    ABS. MONOCYTES 0.6 0.1 - 1.3 K/UL    ABS. EOSINOPHILS 0.1 0.0 - 0.8 K/UL    ABS. BASOPHILS 0.1 0.0 - 0.2 K/UL    ABS. IMM. GRANS. 0.0 0.0 - 0.5 K/UL   METABOLIC PANEL, COMPREHENSIVE    Collection Time: 08/18/19 11:22 AM   Result Value Ref Range    Sodium 136 136 - 145 mmol/L    Potassium 3.9 3.5 - 5.1 mmol/L    Chloride 102 98 - 107 mmol/L    CO2 23 21 - 32 mmol/L    Anion gap 11 7 - 16 mmol/L    Glucose 243 (H) 65 - 100 mg/dL    BUN 25 (H) 8 - 23 MG/DL    Creatinine 1.51 (H) 0.8 - 1.5 MG/DL    GFR est AA 58 (L) >60 ml/min/1.73m2    GFR est non-AA 48 (L) >60 ml/min/1.73m2    Calcium 9.1 8.3 - 10.4 MG/DL    Bilirubin, total 1.1 0.2 - 1.1 MG/DL    ALT (SGPT) 23 12 - 65 U/L    AST (SGOT) 28 15 - 37 U/L    Alk. phosphatase 113 50 - 136 U/L    Protein, total 6.9 6.3 - 8.2 g/dL    Albumin 3.5 3.2 - 4.6 g/dL    Globulin 3.4 2.3 - 3.5 g/dL    A-G Ratio 1.0 (L) 1.2 - 3.5         Imaging:  Xr Chest Pa Lat    Result Date: 8/16/2019  PA and lateral chest radiographs History: f/u post bronchoscopy, 67 years Male Comparison: Chest radiograph August 02, 2019 Findings: Cardiac pacing device obscures part of the left hemithorax, leads appear to remain in anatomic position.   Partially obscured cardiomediastinal silhouette with evidence of CABG. Multiple broken sternal wires appear unchanged. Persistent small left pleural effusion with associated left basilar atelectasis and or consolidation. Probable mild interstitial edema unchanged. No evidence of pneumothorax. Visualized soft tissue and osseous structures otherwise unremarkable. Impression:   No significant interval change. Xr Chest Pa Lat    Result Date: 8/2/2019  AP LATERAL CHEST  8/2/2019 10:42 AM HISTORY:  Shortness of breath COMPARISON: Kaylene 3, 2014 FINDINGS: The cardiac silhouette is prominent. Median sternotomy wires are present. A cardiac fibular device is present. Left hemidiaphragm is elevated. There is no lobar consolidation. IMPRESSION: Elevated left hemidiaphragm. No lobar consolidation. Ct Chest Wo Cont    Result Date: 8/2/2019  CT CHEST WITHOUT CONTRAST 8/2/2019 HISTORY: Congestive heart failure. Lower extremity edema. Recent treatment for pneumonia. TECHNIQUE: Noncontrast axial images were obtained through the chest. Coronal reformatted images were generated. All CT scans at this facility used dose modulation, iterative reconstruction and/or weight based dosing when appropriate to reduce radiation dose to as low as reasonably achievable. COMPARISON: AP lateral chest x-ray from the same day FINDINGS: A cardiac defibrillator device is present. Median sternotomy wires are present. An intracardiac device is present within the left atrial appendage. There is a small to moderate left pleural effusion with atelectasis in the left lung base. Reticulonodular interstitial densities are present in the periphery of the lungs. This may be caused by interstitial scarring or edema. The central airways are patent. The heart size is enlarged. Scattered mediastinal lymph nodes are present including a precarinal node that measures 1.5 cm short axis. This finding may be present in the setting of heart failure.  Included images of the upper abdomen demonstrate mild diffuse left adrenal enlargement. A few calcifications are present in the periphery of the spleen. There are no aggressive osseous lesions. IMPRESSION: Small-to-moderate left pleural effusion with atelectasis in the left lung base. Results for orders placed or performed during the hospital encounter of 19   2D ECHO COMPLETE ADULT (TTE) W OR [address] [facility]  [address], [address] [facility]  [phone]    Transthoracic Echocardiogram  2D, M-mode, Doppler, and Color Doppler    Patient: Sivan Bradley  MR #: [id number]  : [id number]  Age: 67 years  Gender: Male  Study date: 17-Aug-2019  Account #: [de-identified]  Height: 71 in  Weight: 187.7 lb  BSA: 2.05 mï¾²  Status:Routine  Location: 833  BP: 117/ 77    Allergies: DABIGATRAN ETEXILATE, SHELLFISH DERIVED    Sonographer:  No Yates [facility]  Group:  [facility]  Referring Physician:  Yandel Gunter MD  Reading Physician:  Juaquin Krabbe. Hellen Nichols DO [facility]    INDICATIONS: CHF. PROCEDURE: This was a routine study. A transthoracic echocardiogram was  performed. The study included complete 2D imaging, M-mode, complete spectral  Doppler, and color Doppler. Intravenous contrast (Definity) was administered. Echocardiographic views were limited by poor acoustic window availability. Image quality was adequate. LEFT VENTRICLE: The ventricle was moderately dilated with a small laminar  echogenic layer seen in the LV apex consistent with thrombus formation. Systolic function was markedly reduced. Ejection fraction was estimated in   the  range of 10 % to 15 %. There was a thrombus noted along the [place] and Apical  septal wall. There was severe diffuse hypokinesis. Wall thickness was normal.  The study was not technically sufficient to allow evaluation of LV diastolic  function. RIGHT VENTRICLE: The ventricle was dilated. Systolic function was reduced.  A  pacing wire was present. Estimated peak pressure was in the range of 85-90   mmHg. LEFT ATRIUM: The atrium was markedly dilated. RIGHT ATRIUM: The atrium was markedly dilated. A pacing wire was present. SYSTEMIC VEINS: IVC: The inferior vena cava was mildly dilated. Respirophasic  changes in dimension were absent. AORTIC VALVE: The valve was trileaflet. Leaflets exhibited mild   calcification. There was no evidence for stenosis. There was no insufficiency. MITRAL VALVE: There was mild diffuse thickening of the anterior and posterior  leaflets. There was no evidence for stenosis. There was mild to moderate  regurgitation. TRICUSPID VALVE: The valve structure was normal. There was no evidence for  stenosis. There was mild to moderate regurgitation. PULMONIC VALVE: The valve structure was normal. There was no evidence for  stenosis. There was mild regurgitation. PERICARDIUM: There was no pericardial effusion. AORTA: The root exhibited normal size. SUMMARY:    -  Left ventricle: The ventricle was moderately dilated with a small laminar  echogenic layer seen in the LV apex consistent with thrombus formation. Systolic function was markedly reduced. Ejection fraction was estimated in   the  range of 10 % to 15 %. There was a thrombus noted along the [place] and Apical  septal wall. There was severe diffuse hypokinesis. -  Right ventricle: The ventricle was dilated. Systolic function was reduced. -  Left atrium: The atrium was markedly dilated. -  Right atrium: The atrium was markedly dilated. -  Inferior vena cava, hepatic veins: The inferior vena cava was mildly  dilated. Respirophasic changes in dimension were absent. -  Mitral valve: There was mild to moderate regurgitation.    -  Tricuspid valve: There was mild to moderate regurgitation.     SYSTEM MEASUREMENT TABLES    2D  Ao Diam: 3.3 cm  %FS: 7.7 %  IVSd: 1.2 cm  LVIDd: 6 cm  LVIDs: 5.5 cm  LVOT Diam: 2.1 cm  LVPWd: 0.9 cm  RVIDd: 2.9 cm    CW  TR Vmax: 4.1 m/s  TR maxP.4 mmHg    Prepared and signed by    Lexa Fonseca DO [facility]  Signed 17-Aug-2019 15:32:03         Cultures: All Micro Results     None          Assessment/Plan:     Principal Problem:    Acute on chronic systolic congestive heart failure ([address] (9/15/2015)  - Last EF 15% --> now 10% with atrial thrombus  - Continue IV Lasix  - Continue Lisinopril  - Continue Spironolactone  - Continue Coreg  - Appreciate Cardiology's input and assistance    Active Problems:    SOB (shortness of breath) (9/15/2015)  - Likely due to sCHF  - Continue IV Lasix  - Monitor closely      Syncope (2019)  - ICD shows no shocks  - Continue remote telemetry      Pleural effusion (2019)  - Appears stable  - Pulmonary consulted      Atypical atrial flutter ([address] (2016)  - No acute issues  - Continue Tikosyn  - Continue Coreg  - S/P Watchman device --> continue ASA      Persistent atrial fibrillation ([address] (2016)  - No acute issues  - Continue Tikosyn  - Continue Coreg  - S/P Watchman device --> continue ASA      Hypertension (2014)  - Stable  - Continue Lisinopril  - Continue Spironolactone  - Continue Coreg      Coronary atherosclerosis of native coronary vessel (9/15/2015)  - No acute CP  - Continue ASA  - Continue Lisinopril      Chronic kidney disease (10/10/2014)  - Stable      Hypothyroidism (6/3/2014)  - Continue Levothyroxine      Hyperlipidemia (9/15/2015)    Dispo - Continue IV Lasix. Palliative care consult. Likely discharge home in next 1-2 days.     DIET CARDIAC Regular; 2 GM NA (House Low NA)    DVT Prophylaxis: Heparin      Signed By: Elsa Mcnamara DO     2019

## 2019-08-18 NOTE — PROGRESS NOTES
Pt resting in the bed. Pt is stable. No distress noted. No needs expressed. SBAR report given to the oncoming nurse.

## 2019-08-18 NOTE — PHYSICIAN ADVISORY
Letter of Status Determination:   Recommend hospitalization status    INPATIENT       Pt Name:  Karyna Soto   MR#   [facility] # [id number] /  [id number]  [place] / Plan: [place] MEDICARE PART A AND B / Product Type: Medicare /    [facility]#  [id number]   Room and Hospital  833/01  @ [address] [facility]   Hospitalization date  8/16/2019  4:48 PM   Current Attending Physician  [address],    Principal diagnosis  CHF exacerbation [facility]) [I50.9]     Clinicals  67 y.o. y.o  male hospitalized with above diagnosis   Karyna Soto is a 67 y.o. male with a past medical history of sCHF, CAD, atrial flutter who presents to the ER with complaint of SOB, lightheadness, and two episodes of syncope today. He reports that he was treated for CAP at [facility] about a month ago, and since that time has not felt back to his baseline. He reports worsening BLE edema and SOB for the past 2-3 days. Reports tht he still feels SOB whenever he moves. Denies any missed doses of his home Lasix. SHF:  Agree with IV lasix, will check echo and follow. Review pleural fluid. Ask pulmonary to see. Follow AM labs.       2. Syncope: check device. Remain on meds, check echo and follow     3. PAF:  Remain on tikosyn, s/p watchman on ASA only now. Follow labs, follow K and Mg.      4. ICD:  Ask MDT to check device.      5. CAD:  Prior CABG, known ICM. Trop neg.   Check echo, remain on meds,         [place] (MCG) criteria   Does  NOT  apply Acute on Chronic Heart Failure requiring IV diuresis    STATUS DETERMINATION  INPATIENT       Additional comments     [place] / [place] A [place] B / Product Type: Medicare /           Davina Worley MD

## 2019-08-18 NOTE — PROGRESS NOTES
Pt sitting up in the bed for comfort. Pt is stable. Pt is axo. Respirations are even and unlabored at rest. Pt believes that he does not require oxygen while at rest. Pt states that he was able to get some sleep last night. No needs expressed. Safety measures are in place. Will continue to monitor.

## 2019-08-18 NOTE — PROGRESS NOTES
Patient is currently sleeping  has slept on/off throughout the night  Patient is able to sleep laying down without difficulty breathing/no signs of distress at the present time   Bed is in the lowest position wheels locked call light within reach  Will give BSR to oncoming RN upon arrival

## 2019-08-19 ENCOUNTER — HOSPICE ADMISSION (OUTPATIENT)
Dept: HOSPICE | Facility: HOSPICE | Age: 79
End: 2019-08-19

## 2019-08-19 ENCOUNTER — PATIENT OUTREACH (OUTPATIENT)
Dept: CASE MANAGEMENT | Age: 79
End: 2019-08-19

## 2019-08-19 VITALS
SYSTOLIC BLOOD PRESSURE: 99 MMHG | RESPIRATION RATE: 18 BRPM | OXYGEN SATURATION: 94 % | HEART RATE: 66 BPM | WEIGHT: 192.24 LBS | HEIGHT: 71 IN | TEMPERATURE: 98.5 F | BODY MASS INDEX: 26.91 KG/M2 | DIASTOLIC BLOOD PRESSURE: 60 MMHG

## 2019-08-19 LAB
ALBUMIN SERPL-MCNC: 3.4 G/DL (ref 3.2–4.6)
ALBUMIN/GLOB SERPL: 0.9 {RATIO} (ref 1.2–3.5)
ALP SERPL-CCNC: 105 U/L (ref 50–136)
ALT SERPL-CCNC: 20 U/L (ref 12–65)
ANION GAP SERPL CALC-SCNC: 11 MMOL/L (ref 7–16)
AST SERPL-CCNC: 23 U/L (ref 15–37)
BASOPHILS # BLD: 0.1 K/UL (ref 0–0.2)
BASOPHILS NFR BLD: 1 % (ref 0–2)
BILIRUB SERPL-MCNC: 1.5 MG/DL (ref 0.2–1.1)
BUN SERPL-MCNC: 26 MG/DL (ref 8–23)
CALCIUM SERPL-MCNC: 9.7 MG/DL (ref 8.3–10.4)
CHLORIDE SERPL-SCNC: 102 MMOL/L (ref 98–107)
CO2 SERPL-SCNC: 27 MMOL/L (ref 21–32)
CREAT SERPL-MCNC: 1.49 MG/DL (ref 0.8–1.5)
DIFFERENTIAL METHOD BLD: ABNORMAL
EOSINOPHIL # BLD: 0.1 K/UL (ref 0–0.8)
EOSINOPHIL NFR BLD: 1 % (ref 0.5–7.8)
ERYTHROCYTE [DISTWIDTH] IN BLOOD BY AUTOMATED COUNT: 17 % (ref 11.9–14.6)
GLOBULIN SER CALC-MCNC: 3.6 G/DL (ref 2.3–3.5)
GLUCOSE SERPL-MCNC: 94 MG/DL (ref 65–100)
HCT VFR BLD AUTO: 45 % (ref 41.1–50.3)
HGB BLD-MCNC: 14.7 G/DL (ref 13.6–17.2)
IMM GRANULOCYTES # BLD AUTO: 0 K/UL (ref 0–0.5)
IMM GRANULOCYTES NFR BLD AUTO: 0 % (ref 0–5)
LYMPHOCYTES # BLD: 2.6 K/UL (ref 0.5–4.6)
LYMPHOCYTES NFR BLD: 31 % (ref 13–44)
MAGNESIUM SERPL-MCNC: 2.5 MG/DL (ref 1.8–2.4)
MCH RBC QN AUTO: 31.8 PG (ref 26.1–32.9)
MCHC RBC AUTO-ENTMCNC: 32.7 G/DL (ref 31.4–35)
MCV RBC AUTO: 97.4 FL (ref 79.6–97.8)
MONOCYTES # BLD: 0.7 K/UL (ref 0.1–1.3)
MONOCYTES NFR BLD: 9 % (ref 4–12)
NEUTS SEG # BLD: 4.9 K/UL (ref 1.7–8.2)
NEUTS SEG NFR BLD: 59 % (ref 43–78)
NRBC # BLD: 0 K/UL (ref 0–0.2)
PLATELET # BLD AUTO: 102 K/UL (ref 150–450)
PMV BLD AUTO: 11.5 FL (ref 9.4–12.3)
POTASSIUM SERPL-SCNC: 3.8 MMOL/L (ref 3.5–5.1)
PROT SERPL-MCNC: 7 G/DL (ref 6.3–8.2)
RBC # BLD AUTO: 4.62 M/UL (ref 4.23–5.6)
SODIUM SERPL-SCNC: 140 MMOL/L (ref 136–145)
WBC # BLD AUTO: 8.4 K/UL (ref 4.3–11.1)

## 2019-08-19 PROCEDURE — 80053 COMPREHEN METABOLIC PANEL: CPT

## 2019-08-19 PROCEDURE — 36415 COLL VENOUS BLD VENIPUNCTURE: CPT

## 2019-08-19 PROCEDURE — 74011250636 HC RX REV CODE- 250/636: Performed by: FAMILY MEDICINE

## 2019-08-19 PROCEDURE — 85025 COMPLETE CBC W/AUTO DIFF WBC: CPT

## 2019-08-19 PROCEDURE — 74011250637 HC RX REV CODE- 250/637: Performed by: FAMILY MEDICINE

## 2019-08-19 PROCEDURE — 99222 1ST HOSP IP/OBS MODERATE 55: CPT | Performed by: INTERNAL MEDICINE

## 2019-08-19 PROCEDURE — 83735 ASSAY OF MAGNESIUM: CPT

## 2019-08-19 PROCEDURE — 74011250637 HC RX REV CODE- 250/637: Performed by: INTERNAL MEDICINE

## 2019-08-19 RX ORDER — HYDROCODONE BITARTRATE AND ACETAMINOPHEN 5; 325 MG/1; MG/1
1 TABLET ORAL
Qty: 30 TAB | Refills: 0 | Status: SHIPPED | OUTPATIENT
Start: 2019-08-19 | End: 2019-08-26

## 2019-08-19 RX ORDER — FUROSEMIDE 40 MG/1
80 TABLET ORAL
Status: DISCONTINUED | OUTPATIENT
Start: 2019-08-19 | End: 2019-08-19 | Stop reason: HOSPADM

## 2019-08-19 RX ORDER — FUROSEMIDE 80 MG/1
80 TABLET ORAL 2 TIMES DAILY WITH MEALS
Qty: 60 TAB | Refills: 1 | Status: SHIPPED | OUTPATIENT
Start: 2019-08-19

## 2019-08-19 RX ORDER — FUROSEMIDE 40 MG/1
80 TABLET ORAL
Status: DISCONTINUED | OUTPATIENT
Start: 2019-08-19 | End: 2019-08-19

## 2019-08-19 RX ADMIN — LEVOTHYROXINE SODIUM 125 MCG: 125 TABLET ORAL at 06:42

## 2019-08-19 RX ADMIN — FINASTERIDE 5 MG: 5 TABLET, FILM COATED ORAL at 09:52

## 2019-08-19 RX ADMIN — FUROSEMIDE 80 MG: 40 TABLET ORAL at 13:19

## 2019-08-19 RX ADMIN — SPIRONOLACTONE 25 MG: 25 TABLET ORAL at 09:52

## 2019-08-19 RX ADMIN — ASPIRIN 81 MG 81 MG: 81 TABLET ORAL at 09:53

## 2019-08-19 RX ADMIN — DOFETILIDE 250 MCG: 0.25 CAPSULE ORAL at 09:53

## 2019-08-19 RX ADMIN — CARVEDILOL 12.5 MG: 12.5 TABLET, FILM COATED ORAL at 09:53

## 2019-08-19 RX ADMIN — HEPARIN SODIUM 5000 UNITS: 5000 INJECTION INTRAVENOUS; SUBCUTANEOUS at 06:42

## 2019-08-19 NOTE — PROGRESS NOTES
Initial visit to assess pt's spiritual needs. Feeling today? ok    Receiving good care? yes    Needs from Spiritual Care:  none  Ministry of presence & prayer to demonstrate caring & concern, convey emotional & spiritual support.     kendra Roach MDiv,[facility],PhD

## 2019-08-19 NOTE — PROGRESS NOTES
Hospice order and clinicals faxed to [facility].  [facility] liaison will meet with the family today at 1 pm.

## 2019-08-19 NOTE — DISCHARGE INSTRUCTIONS
Patient Education   Patient Education        Hospice: Care Instructions  Your Care Instructions  Hospice care provides medical treatment to relieve symptoms at the end of life. The goal is to keep you comfortable, not to try to cure you. Hospice care does not speed up or lengthen dying. It focuses on easing pain and other symptoms. Hospice caregivers want to enhance your quality of life. Hospice care also offers emotional help and spiritual support when you are dying. It also helps family members care for a loved one who is dying. Hospice care can help you review your life, say important things to family and friends, and explore spiritual issues. Hospice also helps your family and friends deal with their grief after you die. You can use hospice care if your illness cannot be cured and doctors believe you have no more than 6 months to live. You do not need to be confined to a bed or in a hospital to benefit from this type of care. The hospice team includes nurses, counselors, therapists, social workers, pastors, home health aides, and trained volunteers. You can get care in your own home or in a hospice center. Some hospice workers also go to nursing homes or hospitals. How can you care for yourself at home? · Prepare a list of advance directives. These are instructions to your doctor and family members about what kind of care you want if you become unable to speak or express yourself. · Find out if your health insurance covers hospice care. · Find hospice programs in your area. People who can help include your doctor, your state health department, and your insurance company. · Decide what kinds of hospice services you want. It helps to know what you want before you enter a hospice program.  · Think about some questions when preparing for hospice care. ? Who do you want to make decisions about your medical care if you are not able to? Many people choose their spouse, child, or doctor. ?  What are you most afraid of that might happen? You might be afraid of having pain or losing your independence. Let your hospice team know your fears. The team can help you deal with them. ? Where would you prefer to die? Choices include your home, a hospital, or a nursing home. ? Do you want to donate organs when you die? Make sure that your family clearly understands this. ? Do you want any Adventism rites or practices to be done before you die? Let your hospice team know what you want. Where can you learn more? Go to http://danelle-johann.info/. Enter A464 in the search box to learn more about \"Hospice: Care Instructions. \"  Current as of: April 1, 2019  Content Version: 12.1  © 8825-4232 [employer]. Care instructions adapted under license by [employer] (which disclaims liability or warranty for this information). If you have questions about a medical condition or this instruction, always ask your healthcare professional. [place] [address] any warranty or liability for your use of this information. Heart Failure: Care Instructions  Your Care Instructions    Heart failure occurs when your heart does not pump as much blood as the body needs. Failure does not mean that the heart has stopped pumping but rather that it is not pumping as well as it should. Over time, this causes fluid buildup in your lungs and other parts of your body. Fluid buildup can cause shortness of breath, fatigue, swollen ankles, and other problems. By taking medicines regularly, reducing sodium (salt) in your diet, checking your weight every day, and making lifestyle changes, you can feel better and live longer. Follow-up care is a key part of your treatment and safety. Be sure to make and go to all appointments, and call your doctor if you are having problems. It's also a good idea to know your test results and keep a list of the medicines you take.   How can you care for yourself at home?  Medicines    · Be safe with medicines. Take your medicines exactly as prescribed. Call your doctor if you think you are having a problem with your medicine.     · Do not take any vitamins, over-the-counter medicine, or herbal products without talking to your doctor first. Mindaa Popper not take ibuprofen (Advil or Motrin) and naproxen (Aleve) without talking to your doctor first. They could make your heart failure worse.     · You may be taking some of the following medicine. ? Beta-blockers can slow heart rate, decrease blood pressure, and improve your condition. Taking a beta-blocker may lower your chance of needing to be hospitalized. ? Angiotensin-converting enzyme inhibitors (ACEIs) reduce the heart's workload, lower blood pressure, and reduce swelling. Taking an ACEI may lower your chance of needing to be hospitalized again. ? Angiotensin II receptor blockers (ARBs) work like ACEIs. Your doctor may prescribe them instead of ACEIs. ? Diuretics, also called water pills, reduce swelling. ? Potassium supplements replace this important mineral, which is sometimes lost with diuretics. ? Aspirin and other blood thinners prevent blood clots, which can cause a stroke or heart attack.    You will get more details on the specific medicines your doctor prescribes. Diet    · Your doctor may suggest that you limit sodium to 2,000 milligrams (mg) a day or less. That is less than 1 teaspoon of salt a day, including all the salt you eat in cooking or in packaged foods. People get most of their sodium from processed foods. Fast food and restaurant meals also tend to be very high in sodium.     · Ask your doctor how much liquid you can drink each day. You may have to limit liquids.    Weight    · Weigh yourself without clothing at the same time each day. Record your weight. Call your doctor if you have a sudden weight gain, such as more than 2 to 3 pounds in a day or 5 pounds in a week.  (Your doctor may suggest a different range of weight gain.) A sudden weight gain may mean that your heart failure is getting worse.    Activity level    · Start light exercise (if your doctor says it is okay). Even if you can only do a small amount, exercise will help you get stronger, have more energy, and manage your weight and your stress. Walking is an easy way to get exercise. Start out by walking a little more than you did before. Bit by bit, increase the amount you walk.     · When you exercise, watch for signs that your heart is working too hard. You are pushing yourself too hard if you cannot talk while you are exercising. If you become short of breath or dizzy or have chest pain, stop, sit down, and rest.     · If you feel \"wiped out\" the day after you exercise, walk slower or for a shorter distance until you can work up to a better pace.     · Get enough rest at night. Sleeping with 1 or 2 pillows under your upper body and head may help you breathe easier.    Lifestyle changes    · Do not smoke. Smoking can make a heart condition worse. If you need help quitting, talk to your doctor about stop-smoking programs and medicines. These can increase your chances of quitting for good. Quitting smoking may be the most important step you can take to protect your heart.     · Limit alcohol to 2 drinks a day for men and 1 drink a day for women. Too much alcohol can cause health problems.     · Avoid getting sick from colds and the flu. Get a pneumococcal vaccine shot. If you have had one before, ask your doctor whether you need another dose. Get a flu shot each year. If you must be around people with colds or the flu, wash your hands often. When should you call for help?   Call 911 if you have symptoms of sudden heart failure such as:    · You have severe trouble breathing.     · You cough up pink, foamy mucus.     · You have a new irregular or rapid heartbeat.    Call your doctor now or seek immediate medical care if:    · You have new or increased shortness of breath.     · You are dizzy or lightheaded, or you feel like you may faint.     · You have sudden weight gain, such as more than 2 to 3 pounds in a day or 5 pounds in a week. (Your doctor may suggest a different range of weight gain.)     · You have increased swelling in your legs, ankles, or feet.     · You are suddenly so tired or weak that you cannot do your usual activities.    Watch closely for changes in your health, and be sure to contact your doctor if you develop new symptoms. Where can you learn more? Go to http://danelle-johann.info/. Enter L698 in the search box to learn more about \"Heart Failure: Care Instructions. \"  Current as of: July 22, 2018  Content Version: 12.1  © 2447-8088 [employer]. Care instructions adapted under license by [employer] (which disclaims liability or warranty for this information). If you have questions about a medical condition or this instruction, always ask your healthcare professional. [place] [address] any warranty or liability for your use of this information. DISCHARGE SUMMARY from Nurse    PATIENT INSTRUCTIONS:    After general anesthesia or intravenous sedation, for 24 hours or while taking prescription Narcotics:  · Limit your activities  · Do not drive and operate hazardous machinery  · Do not make important personal or business decisions  · Do  not drink alcoholic beverages  · If you have not urinated within 8 hours after discharge, please contact your surgeon on call.     Report the following to your surgeon:  · Excessive pain, swelling, redness or odor of or around the surgical area  · Temperature over 100.5  · Nausea and vomiting lasting longer than 4 hours or if unable to take medications  · Any signs of decreased circulation or nerve impairment to extremity: change in color, persistent  numbness, tingling, coldness or increase pain  · Any questions    What to do at Home:  * Please give a list of your current medications to your Primary Care Provider. *  Please update this list whenever your medications are discontinued, doses are      changed, or new medications (including over-the-counter products) are added. *  Please carry medication information at all times in case of emergency situations. These are general instructions for a healthy lifestyle:    No smoking/ No tobacco products/ Avoid exposure to second hand smoke  Surgeon General's Warning:  Quitting smoking now greatly reduces serious risk to your health. Obesity, smoking, and sedentary lifestyle greatly increases your risk for illness    A healthy diet, regular physical exercise & weight monitoring are important for maintaining a healthy lifestyle    You may be retaining fluid if you have a history of heart failure or if you experience any of the following symptoms:  Weight gain of 3 pounds or more overnight or 5 pounds in a week, increased swelling in our hands or feet or shortness of breath while lying flat in bed. Please call your doctor as soon as you notice any of these symptoms; do not wait until your next office visit. The discharge information has been reviewed with the caregiver. The caregiver verbalized understanding. Discharge medications reviewed with the caregiver and appropriate educational materials and side effects teaching were provided.   ___________________________________________________________________________________________________________________________________

## 2019-08-19 NOTE — PROGRESS NOTES
Care Management Interventions  PCP Verified by CM:  Yes  Transition of Care Consult (CM Consult): [place]: No  Reason Outside [place]: Out of service area  Physical Therapy Consult: No  Occupational Therapy Consult: No  Current Support Network: Lives with Spouse  Confirm Follow Up Transport: Family  Plan discussed with Pt/Family/Caregiver: Yes  Freedom of Choice Offered: Yes  Discharge Location  Discharge Placement: Home with hospice  [facility] will present to family at 1 pm.

## 2019-08-19 NOTE — DISCHARGE SUMMARY
Hospitalist Discharge Summary     Patient ID:  Jill Gonzalez  [id number]  22 y.o.  1940  Admit date: 8/16/2019  4:48 PM  Discharge date and time: 8/19/2019  Attending: Kirit Hu DO  PCP:  Rakel Majano MD  Treatment Team: Attending Provider: Kirit Hu DO; Consulting Provider: Charisse Linton DO; Staff Nurse: Miguel Terry, RN; Consulting Provider: Stanislaw Horvath MD    Principal Diagnosis Acute on chronic systolic congestive heart failure [facility]    Hospital Problems as of 8/19/2019 Date Reviewed: 8/2/2019          Codes Class Noted - Resolved POA    * (Principal) Acute on chronic systolic congestive heart failure ([facility] ICD-10-CM: I50.23  ICD-9-CM: 428.23, 428.0  9/15/2015 - Present Yes        RESOLVED: A-fib ([facility] ICD-10-CM: I48.91  ICD-9-CM: 427.31  11/28/2018 - 8/16/2019         Syncope ICD-10-CM: R55  ICD-9-CM: 780.2  8/17/2019 - Present Yes        SOB (shortness of breath) ICD-10-CM: R06.02  ICD-9-CM: 786.05  9/15/2015 - Present Yes        Pleural effusion ICD-10-CM: J90  ICD-9-CM: 511.9  8/2/2019 - Present Yes    Overview Signed 8/18/2019 12:51 PM by Mookie Liu NP     8/18 thoracentesis 500 ml              Persistent atrial fibrillation ([facility] ICD-10-CM: I48.1  ICD-9-CM: 427.31  5/6/2016 - Present Yes    Overview Addendum 5/6/2016 11:59 AM by Eric Jha     1. Attempt A. Fib ablation at [address] - 2015  2.  CV - [facility] - May 2016             Atypical atrial flutter ([facility] ICD-10-CM: I48.4  ICD-9-CM: 427.32  4/14/2016 - Present Yes        Essential hypertension, benign ICD-10-CM: I10  ICD-9-CM: 401.1  9/15/2015 - Present Yes        Hypertension ICD-10-CM: I10  ICD-9-CM: 401.9  11/17/2014 - Present Yes        CAD (coronary artery disease) ICD-10-CM: I25.10  ICD-9-CM: 414.00  6/3/2014 - Present Yes        Coronary atherosclerosis of native coronary vessel ICD-10-CM: I25.10  ICD-9-CM: 414.01  9/15/2015 - Present Yes        Chronic kidney disease ICD-10-CM: N18.9  ICD-9-CM: 585.9  10/10/2014 - Present Yes        Hyperlipidemia ICD-10-CM: E78.5  ICD-9-CM: 272.4  9/15/2015 - Present Yes        Hypothyroidism ICD-10-CM: E03.9  ICD-9-CM: 244.9  6/3/2014 - Present Yes        Chronic systolic CHF (congestive heart failure) (HCC) ICD-10-CM: I50.22  ICD-9-CM: 428.22, 428.0  4/28/2016 - Present         Paroxysmal ventricular tachycardia (HCC) ICD-10-CM: I47.2  ICD-9-CM: 427.1  9/15/2015 - Present         Unstable angina ([facility] ICD-10-CM: I20.0  ICD-9-CM: 411.1  9/15/2015 - Present         RESOLVED: CHF exacerbation ([facility] ICD-10-CM: I50.9  ICD-9-CM: 428.0  8/16/2019 - 8/16/2019               Hospital Course:  67year old [address] a PMH of sCHF (EF 15%-20%), CAD, atrial flutter admitted for acute sCHF exacerbation with complaints of SOB, lightheadness, and two episodes of syncope. He recently had pneumonia at [facility] and hasn't felt better since discharge. He was admitted and started on IV Lasix 40mg BID. He diuresed well and his edema and SOB improved. Repeat ECHO showed an EF 10% and a possible atrial thrombus. After discussion with Cardiology and palliative care, the patient and his wife decided for the patient to go home with home hospice. His ICD was turned off. He was started on Lasix 80mg PO BID. He was discharged home in stable condition with hospice.     Significant Diagnostic Studies:    Labs: Results:       Chemistry Recent Labs     08/19/19  0835 08/18/19  1122 08/17/19  0725   GLU 94 243* 91    136 138   K 3.8 3.9 3.8    102 102   CO2 27 23 25   BUN 26* 25* 30*   CREA 1.49 1.51* 1.47   CA 9.7 9.1 9.3   AGAP 11 11 11    113 116   TP 7.0 6.9 7.3   ALB 3.4 3.5 3.4   GLOB 3.6* 3.4 3.9*   AGRAT 0.9* 1.0* 0.9*      CBC w/Diff Recent Labs     08/19/19  0835 08/18/19  1122 08/17/19  0725   WBC 8.4 7.9 9.5   RBC 4.62 4.75 4.70   HGB 14.7 15.2 15.2   HCT 45.0 46.2 45.5   * 113* 117*   GRANS 59 65 57   LYMPH 31 26 32   EOS 1 1 1      Cardiac Enzymes No results for input(s): CPK, CKND1, SILVIO in the last 72 hours. No lab exists for component: CKRMB, TROIP   Coagulation No results for input(s): PTP, INR, APTT in the last 72 hours. No lab exists for component: INREXT    Lipid Panel Lab Results   Component Value Date/Time    Cholesterol, total 196 06/19/2014 01:28 PM    HDL Cholesterol 59 06/19/2014 01:28 PM    LDL, calculated 121.4 (H) 06/19/2014 01:28 PM    VLDL, calculated 15.6 06/19/2014 01:28 PM    Triglyceride 78 06/19/2014 01:28 PM    CHOL/HDL Ratio 3.3 06/19/2014 01:28 PM      BNP No results for input(s): BNPP in the last 72 hours. Liver Enzymes Recent Labs     08/19/19  0835   TP 7.0   ALB 3.4      SGOT 23      Thyroid Studies Lab Results   Component Value Date/Time    T4, Total 10.9 07/21/2014 12:28 PM    TSH 1.470 08/18/2019 07:23 AM            Imaging:  Xr Chest Pa Lat    Result Date: 8/16/2019  Impression:   No significant interval change. Microbiology/Cultures: All Micro Results     None          Discharge Exam:  Visit Vitals  /69   Pulse 60   Temp 98.5 °F (36.9 °C)   Resp 19   Ht 5' 11\" (1.803 m)   Wt 87.2 kg (192 lb 3.9 oz)   SpO2 92%   BMI 26.81 kg/m²     General appearance: alert, cooperative, no distress, appears stated age  Lungs: clear to auscultation bilaterally  Heart: regular rate and rhythm, S1, S2 normal, no murmur, click, rub or gallop  Abdomen: soft, non-tender. Bowel sounds normal. No masses,  no organomegaly  Extremities: Trace BLE edema  Neurologic: Grossly normal    Disposition: home  Discharge Condition: stable  Patient Instructions:   Current Discharge Medication List      START taking these medications    Details   !! furosemide (LASIX) 80 mg tablet Take 1 Tab by mouth two (2) times daily (with meals). Qty: 60 Tab, Refills: 1      HYDROcodone-acetaminophen (NORCO) 5-325 mg per tablet Take 1 Tab by mouth every four (4) hours as needed for Pain (Shortness of Breath) for up to 7 days.  Max Daily Amount: 6 Tabs. Qty: 30 Tab, Refills: 0    Associated Diagnoses: Pleural effusion       !! - Potential duplicate medications found. Please discuss with provider. CONTINUE these medications which have NOT CHANGED    Details   fluticasone furoate-vilanterol (BREO ELLIPTA) 100-25 mcg/dose inhaler Take 1 Puff by inhalation daily. Qty: 1 Inhaler, Refills: 1      spironolactone (ALDACTONE) 25 mg tablet Take 1 Tab by mouth daily. Qty: 90 Tab, Refills: 3    Associated Diagnoses: Ischemic cardiomyopathy      !! furosemide (LASIX) 40 mg tablet Take 1 Tab by mouth as needed (for weight gain 3 lbs overnight/ 5 lbs in week). If light headedness occurs, take 0.5 tablet daily. Qty: 90 Tab, Refills: 3    Associated Diagnoses: Ischemic cardiomyopathy      carvedilol (COREG) 12.5 mg tablet Take 1 Tab by mouth two (2) times daily (with meals). Qty: 180 Tab, Refills: 3    Associated Diagnoses: Chronic systolic CHF (congestive heart failure) ([address] Persistent atrial fibrillation (HCC)      dofetilide (TIKOSYN) 250 mcg capsule Take 1 Cap by mouth two (2) times a day. Qty: 180 Cap, Refills: 1      omega-3 fatty acids-vitamin e 2,000-650-12 mg/2.5 gram elpk Take 2,000 mg by mouth daily. ALPRAZolam (XANAX) 0.5 mg tablet Take 0.5 mg by mouth nightly as needed. Refills: 2      finasteride (PROSCAR) 5 mg tablet Take 5 mg by mouth daily. Refills: 4      multivitamin (ONE A DAY) tablet Take 1 Tab by mouth daily. OTHER daily. Tumeric daily      aspirin 81 mg chewable tablet Take 81 mg by mouth daily. levothyroxine (SYNTHROID) 125 mcg tablet Take 125 mcg by mouth Daily (before breakfast). !! - Potential duplicate medications found. Please discuss with provider.           Activity: Activity as tolerated  Diet: Regular Diet  Wound Care: None needed    Follow-up  ·   Dr. Alejandro Hogan in one week  Time spent to discharge patient 35 minutes  Signed:  Maria Del Rosario Purvis DO  8/19/2019  11:16 AM

## 2019-08-19 NOTE — WOUND CARE
Pt seen for erythema to buttocks area. Noted blanchable erythema  Applied zinc barrier cream. Instructed pt to lay on sides as much as possible, pt very mobile, no other skin issues noted. Recommend zinc barrier cream to affected area daily and as needed. Wound team will continue to follow while in acute care setting.

## 2019-08-19 NOTE — PROGRESS NOTES
8/19/2019 8:58 AM    Admit Date: 8/16/2019        Subjective:     Tina Ramsay reports feeling better Less SOB edema resolved. Objective:      Visit Vitals  /69   Pulse 60   Temp 98.5 °F (36.9 °C)   Resp 19   Ht 5' 11\" (1.803 m)   Wt 87.2 kg (192 lb 3.9 oz)   SpO2 92%   BMI 26.81 kg/m²       Physical Exam:  Heart: regular rate and rhythm  Lungs: clear to auscultation bilaterally  Extremities: no edema    Data Review:   Labs:    Recent Results (from the past 24 hour(s))   CBC WITH AUTOMATED DIFF    Collection Time: 08/18/19 11:22 AM   Result Value Ref Range    WBC 7.9 4.3 - 11.1 K/uL    RBC 4.75 4.23 - 5.6 M/uL    HGB 15.2 13.6 - 17.2 g/dL    HCT 46.2 41.1 - 50.3 %    MCV 97.3 79.6 - 97.8 FL    MCH 32.0 26.1 - 32.9 PG    MCHC 32.9 31.4 - 35.0 g/dL    RDW 17.1 (H) 11.9 - 14.6 %    PLATELET 661 (L) 383 - 450 K/uL    MPV 12.1 9.4 - 12.3 FL    ABSOLUTE NRBC 0.00 0.0 - 0.2 K/uL    DF AUTOMATED      NEUTROPHILS 65 43 - 78 %    LYMPHOCYTES 26 13 - 44 %    MONOCYTES 8 4.0 - 12.0 %    EOSINOPHILS 1 0.5 - 7.8 %    BASOPHILS 1 0.0 - 2.0 %    IMMATURE GRANULOCYTES 1 0.0 - 5.0 %    ABS. NEUTROPHILS 5.1 1.7 - 8.2 K/UL    ABS. LYMPHOCYTES 2.0 0.5 - 4.6 K/UL    ABS. MONOCYTES 0.6 0.1 - 1.3 K/UL    ABS. EOSINOPHILS 0.1 0.0 - 0.8 K/UL    ABS. BASOPHILS 0.1 0.0 - 0.2 K/UL    ABS. IMM. GRANS. 0.0 0.0 - 0.5 K/UL   METABOLIC PANEL, COMPREHENSIVE    Collection Time: 08/18/19 11:22 AM   Result Value Ref Range    Sodium 136 136 - 145 mmol/L    Potassium 3.9 3.5 - 5.1 mmol/L    Chloride 102 98 - 107 mmol/L    CO2 23 21 - 32 mmol/L    Anion gap 11 7 - 16 mmol/L    Glucose 243 (H) 65 - 100 mg/dL    BUN 25 (H) 8 - 23 MG/DL    Creatinine 1.51 (H) 0.8 - 1.5 MG/DL    GFR est AA 58 (L) >60 ml/min/1.73m2    GFR est non-AA 48 (L) >60 ml/min/1.73m2    Calcium 9.1 8.3 - 10.4 MG/DL    Bilirubin, total 1.1 0.2 - 1.1 MG/DL    ALT (SGPT) 23 12 - 65 U/L    AST (SGOT) 28 15 - 37 U/L    Alk.  phosphatase 113 50 - 136 U/L    Protein, total 6.9 6.3 - 8.2 g/dL    Albumin 3.5 3.2 - 4.6 g/dL    Globulin 3.4 2.3 - 3.5 g/dL    A-G Ratio 1.0 (L) 1.2 - 3.5           ECHO:EF 10-15%      Assessment:     Patient Active Problem List    Diagnosis Date Noted    Syncope 08/17/2019    Pleural effusion 08/02/2019    Hypoxia better 08/02/2019    History of tobacco abuse 08/02/2019    Arrhythmia     Heart failure ([address]     Hx of CABG     Long-term use of high-risk medication 05/23/2016    Persistent atrial fibrillation (HCC) 05/06/2016    Chronic systolic CHF (congestive heart failure) (HCC) severe LV dysfunction continue meds poor prognosis Hospice 04/28/2016    Atypical atrial flutter ([address] 04/14/2016    Hyperlipidemia 09/15/2015    Essential hypertension, benign 09/15/2015    Paroxysmal ventricular tachycardia ([address] 09/15/2015    Coronary atherosclerosis of native coronary vessel 09/15/2015    Rheumatic mitral and aortic valve insufficiency 09/15/2015    Unstable angina (HCC) 09/15/2015    SOB (shortness of breath) 09/15/2015    PVC (premature ventricular contraction) 09/15/2015    Acute on chronic systolic congestive heart failure ([address] 09/15/2015    Other and unspecified angina pectoris 09/15/2015    History of cardiovascular disorder 11/20/2014    Hypertension 11/17/2014    Chronic kidney disease 10/10/2014    History of high risk medication treatment 09/15/2014    Congestive heart failure ([address] 07/08/2014    Coronary arteriosclerosis in native artery 07/08/2014    History of coronary artery bypass surgery 07/08/2014    Cardiac defibrillator in place 07/08/2014    Myocardial infarction ([address] 07/08/2014    Chronic ischemic heart disease 07/08/2014    Subclavian vein thrombosis ([address] 07/08/2014    Ventricular tachycardia ([address] 07/08/2014    Ischemic cardiomyopathy 06/03/2014    CAD (coronary artery disease) 06/03/2014    Biventricular ICD (implantable cardioverter-defibrillator) in place 06/03/2014    Hypothyroidism 06/03/2014       Plan:

## 2019-08-19 NOTE — PROGRESS NOTES
Care Management Interventions  PCP Verified by CM: Yes  Transition of Care Consult (CM Consult): [place]: No  Reason Outside [place]: Out of service area  Physical Therapy Consult: No  Occupational Therapy Consult: No  Current Support Network: Lives with Spouse  Confirm Follow Up Transport: Family  Plan discussed with Pt/Family/Caregiver: Yes  Freedom of Choice Offered: Yes  Discharge Location  Discharge Placement: Home with hospice  Patient is discharging today with [facility]. Patient and spouse were instructed to contact [facility] when they arrive home today. Billy Bear will deliver home 02.

## 2019-08-19 NOTE — PROGRESS NOTES
IP consult to Cardiac Rehab. Chart review completed. Noted that pt is requesting palliative care so I will not pursue Cardiac Rehab with this patient. Thank you.

## 2019-08-19 NOTE — CONSULTS
Palliative Care    Patient: Jill Gonzalez MRN: [id number]  SSN: [id number]    YOB: 1940  Age: 67 y.o. Sex: male       Date of Request: 8/19/2019  Date of Consult:  8/19/2019  Reason for Consult:  pain and symptom management and goals of care  Requesting Physician: Dr. Klein Render     Assessment/Plan:     Principal Diagnosis:    Dyspnea  R06.00    Additional Diagnoses:   · Debility, Unspecified  R53.81  · Fatigue, Lethargy  R53.83  · Frailty  R54  · Encounter for Palliative Care  Z51.5    Palliative Performance Scale (PPS):   50%    Medical Decision Making:   Reviewed and summarized labs and imaging. I met with pt and spouse at bedside. Pt recalled significant functional decline over past 5-6 weeks leading to recurrent hospitalizations and findings of end stage heart failure. Pt discussed difficulties with breathing and feeling like he was drowning. States symptoms are currently improved but he understands the severity of his disease and wants to be comfortable. Pt does not wish to pursue aggressive treatments. Spouse with questions regarding hospice. I reviewed hospice philosophies and levels of care. Explained goals of providing quality of life for remainder of life. Pt states he is ready to return home. Stated give time to arrange things so transition home is optimal.    Continue xanax for anxiety  Continue norco for dyspnea/pain  Will need O2 arranged in home setting. I have discussed with SW and hospitalist    Will discuss findings with members of the interdisciplinary team.      Thank you for this referral.         Subjective:     History obtained from:  Patient and Chart    Chief Complaint: dyspnea  History of Present Illness:  67year old [address] a PMH of sCHF, CAD, atrial flutter admitted for acute sCHF exacerbation with complaints of SOB, lightheadness, and two episodes of syncope. Pt with recurrent hospitalizations. Found to have end stage heart disease with EF of 10%. Signs of liver and renal dysfunction on labs. Advance Directive: Yes       Code Status:  DNR            Health Care Power of : No - Patient does not have a [address]. Pt spouse would be decision maker    Past Medical History:   Diagnosis Date    Acute diastolic CHF (congestive heart failure) ([address] 9/15/2015    Arrhythmia     afib, icd    Atypical atrial flutter ([address] 2016    Biventricular ICD (implantable cardioverter-defibrillator) in place     CAD (coronary artery disease)     stents heart 2002, cabg 2002    Chest pain 9/15/2015    Chronic systolic CHF (congestive heart failure) ([address] 2016    Coronary atherosclerosis of native coronary vessel 9/15/2015    Dyspnea 9/15/2015    Essential hypertension, benign 9/15/2015    Heart failure ([address]     Hx of CABG     Hyperlipidemia 9/15/2015    Hypertension     Hypothyroidism 6/3/2014    Ischemic cardiomyopathy 6/3/2014    Long-term use of high-risk medication 2016    Myocardial infarction acute ([address] 9/15/2015    Other and unspecified angina pectoris 9/15/2015    Palpitations 9/15/2015    Paroxysmal ventricular tachycardia ([address] 9/15/2015    Persistent atrial fibrillation ([address] 2016    1. Attempt A. Fib ablation at [address] -  2.  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    Unstable angina ([address] 9/15/2015      Past Surgical History:   Procedure Laterality Date    CARDIAC SURG PROCEDURE UNLIST      cabg 2004    HX HEENT      tonsillectomy    HX PACEMAKER      2004     Family History   Problem Relation Age of Onset    Heart Attack Other     Heart Failure Other         Congestive      Social History     Tobacco Use    Smoking status: Former Smoker     Last attempt to quit: 2003     Years since quittin.6    Smokeless tobacco: Never Used   [employer] Tobacco comment: pipe quit 10 years ago   Substance Use Topics    Alcohol use: No     Prior to Admission medications    Medication Sig Start Date End Date Taking? Authorizing Provider   fluticasone furoate-vilanterol (BREO ELLIPTA) 100-25 mcg/dose inhaler Take 1 Puff by inhalation daily. 8/2/19   Sylvia Raymond MD   spironolactone (ALDACTONE) 25 mg tablet Take 1 Tab by mouth daily. 7/18/19   Hal Barnard MD   furosemide (LASIX) 40 mg tablet Take 1 Tab by mouth as needed (for weight gain 3 lbs overnight/ 5 lbs in week). If light headedness occurs, take 0.5 tablet daily. 7/18/19   Hal Barnard MD   carvedilol (COREG) 12.5 mg tablet Take 1 Tab by mouth two (2) times daily (with meals). 5/22/19   Hal Barnard MD   dofetilide (TIKOSYN) 250 mcg capsule Take 1 Cap by mouth two (2) times a day. 3/21/19   Hal Barnard MD   omega-3 fatty acids-vitamin e 2,000-650-12 mg/2.5 gram elpk Take 2,000 mg by mouth daily. Provider, Historical   ALPRAZolam (XANAX) 0.5 mg tablet Take 0.5 mg by mouth nightly as needed. 5/2/16   Provider, Historical   finasteride (PROSCAR) 5 mg tablet Take 5 mg by mouth daily. 3/6/16   Provider, Historical   multivitamin (ONE A DAY) tablet Take 1 Tab by mouth daily. Provider, Historical   OTHER daily. Tumeric daily    Provider, Historical   aspirin 81 mg chewable tablet Take 81 mg by mouth daily. Other, MD Rakel   levothyroxine (SYNTHROID) 125 mcg tablet Take 125 mcg by mouth Daily (before breakfast). Provider, Historical       Allergies   Allergen Reactions    Pradaxa [Dabigatran Etexilate] Not Reported This Time and Anaphylaxis    Shellfish Derived Nausea and Vomiting        Review of systems negative with exception of noted above     Objective:     Visit Vitals  /69   Pulse 60   Temp 98.5 °F (36.9 °C)   Resp 19   Ht 5' 11\" (1.803 m)   Wt 192 lb 3.9 oz (87.2 kg)   SpO2 92%   BMI 26.81 kg/m²        Physical Exam:    General:  Cooperative.  No acute distress. Eyes:  Conjunctivae/corneas clear    Nose: Nares normal. Septum midline. Neck: Supple, symmetrical, trachea midline, no JVD   Lungs:   Clear to auscultation bilaterally, unlabored   Heart:  Regular rate and rhythm, no murmur    Abdomen:   Soft, non-tender, non-distended. Positive bowel sounds   Extremities: Normal, atraumatic, no cyanosis or edema   Skin: Skin color, texture, turgor normal. No rash or lesions. Neurologic: Nonfocal   Psych: Alert and oriented      Assessment:     Hospital Problems  Date Reviewed: 8/2/2019          Codes Class Noted POA    Syncope ICD-10-CM: R55  ICD-9-CM: 780.2  8/17/2019 Yes        Pleural effusion ICD-10-CM: J90  ICD-9-CM: 511.9  8/2/2019 Yes    Overview Signed 8/18/2019 12:51 PM by Garret Lundy NP     8/18 thoracentesis 500 ml              Persistent atrial fibrillation ([facility] ICD-10-CM: I48.1  ICD-9-CM: 427.31  5/6/2016 Yes    Overview Addendum 5/6/2016 11:59 AM by Noreen Humphreys     1. Attempt A. Fib ablation at [address] - 2015  2.  CV - Tikosyn - May 2016             Atypical atrial flutter ([facility] ICD-10-CM: I48.4  ICD-9-CM: 427.32  4/14/2016 Yes        Hyperlipidemia ICD-10-CM: E78.5  ICD-9-CM: 272.4  9/15/2015 Yes        Essential hypertension, benign ICD-10-CM: I10  ICD-9-CM: 401.1  9/15/2015 Yes        Coronary atherosclerosis of native coronary vessel ICD-10-CM: I25.10  ICD-9-CM: 414.01  9/15/2015 Yes        SOB (shortness of breath) ICD-10-CM: R06.02  ICD-9-CM: 786.05  9/15/2015 Yes        * (Principal) Acute on chronic systolic congestive heart failure (HCC) ICD-10-CM: I50.23  ICD-9-CM: 428.23, 428.0  9/15/2015 Yes        Hypertension ICD-10-CM: I10  ICD-9-CM: 401.9  11/17/2014 Yes        Chronic kidney disease ICD-10-CM: N18.9  ICD-9-CM: 585.9  10/10/2014 Yes        CAD (coronary artery disease) ICD-10-CM: I25.10  ICD-9-CM: 414.00  6/3/2014 Yes        Hypothyroidism ICD-10-CM: E03.9  ICD-9-CM: 244.9  6/3/2014 Yes              Signed By: Pamela Hernandez Jessica Mcfadden MD     August 19, 2019

## 2019-08-20 ENCOUNTER — PATIENT OUTREACH (OUTPATIENT)
Dept: CASE MANAGEMENT | Age: 79
End: 2019-08-20

## 2019-08-20 NOTE — ROUTINE PROCESS
CHF late entry note: CHF teaching 1 hr per program.    CHF Navigator
Cards consult, echo pending- patient with Hx sHF 20-25%ef. LAST ECHO 1/19. Currently 80IV lasix BID. BNP 1164, Cr. 1.5, net negative -1640ml currently. CHF teaching started post introduction to pt/family; aware of diagnosis. Planner/scale @ BS and will follow. Smoking/ ETOH/Illicit drug use cessation and maintain a healthy weight covered. Pt/family aware that I can not prescribe nor adjust  medications: 15mins    Palliative Care score: 23- wait on cards consult prior to  entering  Refused ACP on admission    Start 2L/D Fluid restriction/ cardiac diet  CHF teaching continues to pt/family. Emphasis on taking prescription meds as ordered, to keep F/U appts and to call MD STAT if any of the following occur:   If you gain 2 lbs in one day or 5 lbs in a week, and short of breath.  If you can not lay flat without developing short of breath or rapid breathing at night; or if it wakes you up. Develop a cough or wheezing.  If you notice swollen hands/feet/ankles or stomach with a bloated/ full feeling.  If you become confused or mentally fuzzy or dizzy.  If you notice a rapid or change in your heart rate.  If you become more exhausted all the time and unable to do the same level of activity without stopping to catch your breath. Drink no more than 8 cups a day in 8 oz. cups. Your Heart can not handle any more. Stay away from salt (limit anything with salt or sodium in it). Limit to 250mg per serving.   Pt/family verbalizes understanding, will follow to reinforce teaching skills: 30 mins
Patient seen by cards, Pulm Conslt pending-1920ml negative since admit. Continue to follow and will obtain TC7 on Monday when the office opens. Spoke with patient and daughter in regards to plan and will continue to follow.
Patient/Caregiver provided printed discharge information.

## 2019-08-20 NOTE — PROGRESS NOTES
This note will not be viewable in [address] No WILLIS outreach indicated due to discharge home with [facility].

## 2019-08-31 LAB
FUNGUS CULTURE, RFCO2T: NEGATIVE
FUNGUS SMEAR, RFCO1T: NORMAL
FUNGUS SPEC CULT: NORMAL
FUNGUS STAIN, 188244: NORMAL
REFLEX TO ID, RFCO3T: NORMAL
SPECIMEN SOURCE: NORMAL
SPECIMEN SOURCE: NORMAL

## 2019-09-15 LAB
ACID FAST STN SPEC: NEGATIVE
MYCOBACTERIUM SPEC QL CULT: NEGATIVE
SPECIMEN PREPARATION: NORMAL
SPECIMEN SOURCE: NORMAL

## (undated) DEVICE — GEL MEDC ULTRASOUND 5L -- REPLACED BY 326862